# Patient Record
Sex: FEMALE | Race: WHITE | Employment: UNEMPLOYED | URBAN - METROPOLITAN AREA
[De-identification: names, ages, dates, MRNs, and addresses within clinical notes are randomized per-mention and may not be internally consistent; named-entity substitution may affect disease eponyms.]

---

## 2017-09-27 ENCOUNTER — HOSPITAL ENCOUNTER (OUTPATIENT)
Dept: PHYSICAL THERAPY | Age: 57
Discharge: HOME | End: 2017-09-27
Payer: COMMERCIAL

## 2017-09-27 PROCEDURE — 97140 MANUAL THERAPY 1/> REGIONS: CPT

## 2017-09-27 PROCEDURE — 97162 PT EVAL MOD COMPLEX 30 MIN: CPT

## 2017-09-27 PROCEDURE — 97530 THERAPEUTIC ACTIVITIES: CPT

## 2017-09-27 NOTE — PROGRESS NOTES
[address] [facility] AT [facility]  [address] [facility] [address] [address],[address], Miguel ledesma, Nadine Davison  Phone [phone]  Fax [phone] / [address] [address] PHYSICAL THERAPY SERVICES  Patient Name: Yanick Ho : 1960   Medical   Diagnosis: S/P rotator cuff repair [Z98.890]  Right shoulder pain [M25.511] Treatment Diagnosis: Right shoulder pain [M25.511]   Onset Date: Chronic x 2 years; DOS 2017     Referral Source: Ana Quintanilla MD Start of [facility]): 2017   Prior Hospitalization: See medical history Provider #: [id number]   Prior Level of Function: Indpendent ADL/IADL's. Able to complete household chores and recreational exercise at gym (cardio, circuit training)   Comorbidities: Unremarkable per pt intake form   Medications: Verified on Patient Summary List   The Plan of Care and following information is based on the information from the initial evaluation.   =================================================================================  Assessment / key information:  Pt is a 62y.o. year old, RHD female with subjective complaints of R shoulder pain s/p RCR, SAD on 2017. Current pain is rated as 5/10 best, worst and current. Right arm is in sling, presents with post op dressing in place. FOTO score= 16/100 indicating significant impairment to functional activities. Today's evaulation is significant for:   Dressing is removed and surgical site observed with good signs of healing, steri-strips are in place with no break through bleeding, drainage or excessive swelling noted. Pt and dtr are educated on ongoing wound care (i.e. Leave steri-strips dry and intact) and red flags including redness, warmth, drainage, bleeding and they verbalize understanding and agreement to f/u with MD with any concerns if they develop. PROM measurements as follows: flex= 54, abduction= 60, IR/ER measured in scapular plane= 55/0 degrees respectively.   Pt with moderate muscle guarding to PROM and limited cervical motions due to c/o dizziness from pain meds. Denies numbness/paresthesias. Pt and dtr educated on appropriate HEP for wrist/ strength and plan for skilled progression as per MD protocol. Pt will be a good candidate for skilled PT to address these impairments and promote return to normal ADLs and functional mobility for improved quality of life.    =================================================================================  Eval Complexity: History: MEDIUM  Complexity : 1-2 comorbidities / personal factors will impact the outcome/ POC ; chronicity. Exam:MEDIUM Complexity : 3 Standardized tests and measures addressing body structure, function, activity limitation and / or participation in recreation  Presentation: MEDIUM Complexity : Evolving with changing characteristics  Clinical Decision Making:HIGH Complexity : FOTO score of 1- 25 Overall Complexity:MEDIUM  Problem List: pain affecting function, decrease ROM, decrease strength, decrease ADL/ functional abilitiies, decrease activity tolerance and decrease flexibility/ joint mobility   Treatment Plan may include any combination of the following: Therapeutic exercise, Therapeutic activities, Neuromuscular re-education, Physical agent/modality, Manual therapy, Patient education and Self Care training  Patient / Family readiness to learn indicated by: asking questions, trying to perform skills and interest  Persons(s) to be included in education: patient (P)  Barriers to Learning/Limitations: None  Measures taken:    Patient Goal (s): \"proper healing, full range of motion and strength\"   Patient self reported health status: excellent  Rehabilitation Potential: good   Short Term Goals: To be accomplished in  4  weeks:  1. Patient will demonstrate at least 140 degrees passive flexion. 2.  Independent/compliant with appropriate HEP for RUE strength/ROM as per protocol.   3.  Patient will report 50% improvement in sleep quality stemming from shoulder symptoms    · Long Term Goals: To be accomplished in  6-8  weeks:  1. Pt will be able to progress to active assisted/AROM activities up to 90 degrees pending MD clearance  2. Improve FOTO outcome score by 20-25% to indicate a significant improvement in ADL function  3. Patient will report > 50% functional improvement with washing, dressing ADL's    Frequency / Duration:   Patient to be seen  2-3  times per week for 4-6  Weeks for a total of 12-18 visits:  Patient / Caregiver education and instruction: activity modification, brace/ splint application and exercises  G-Codes (GP): n/a  Therapist Signature: Clarita Crockett PT Date: 7/27/0622   Certification Period: n/a Time: 10:26 AM   ===========================================================================================  I certify that the above Physical Therapy Services are being furnished while the patient is under my care. I agree with the treatment plan and certify that this therapy is necessary. Physician Signature:        Date:       Time:     Please sign and return to [facility] or you may fax the signed copy to [phone]. Thank you.

## 2017-09-27 NOTE — PROGRESS NOTES
PT DAILY TREATMENT NOTE     Patient Name: Radha Ho  Date:2017  : 1960  [x]  Patient  Verified  Payor: BLUE CROSS / Plan: Chip Morales [id number] PPO / Product Type: PPO /    In time: 11:40  Out time:12:18  Total Treatment Time (min): 38  Visit #: 1 of     Treatment Area: S/P rotator cuff repair [Z98.890]  Right shoulder pain [M25.511]    SUBJECTIVE  Pain Level (0-10 scale): 5  Any medication changes, allergies to medications, adverse drug reactions, diagnosis change, or new procedure performed?: [x] No    [] Yes (see summary sheet for update)  Subjective functional status/changes:   [] No changes reported  See POC    OBJECTIVE    See exam on chart for details on objective findings      30 min [x]Eval                  []Re-Eval         8 min Therapeutic Activity:  [x]  See flow sheet. Dressing removed and pt/dtr educated on wound care management; (i.e. To leave steri-strips dry and in place.) Ed on red flags and appropriate referral to MD should any develop. Ed on positioning for sleep with pillow under arm as it is supported in sling; advised to sleep propped up; states she is doing this already. Ed on use of ice PRN for pain control. Rationale:    to improve the patients ability to self manage pain and improve tolerance to sleeping       15 min Manual Therapy:  PROM R shoulder through pain free ROM; gentle. PROM wrist/elbow   Rationale: increase ROM and increase tissue extensibility to perform functional mobility/ADLs and attain goals. With   [] TE   [] TA   [] neuro   [] other: Patient Education: [x] Review HEP    [] Progressed/Changed HEP based on:   [] positioning   [] body mechanics   [] transfers   [] heat/ice application    [] other:      Other Objective/Functional Measures: See POC     Pain Level (0-10 scale) post treatment: 6    ASSESSMENT/Changes in Function: See POC; muscle guarding with PROM limiting tolerance.   Dizziness with attempted UT stretches due to pain meds per pt. Patient will continue to benefit from skilled PT services to modify and progress therapeutic interventions, address ROM deficits and analyze and address soft tissue restrictions to attain remaining goals. []  See Plan of Care  []  See progress note/recertification  []  See Discharge Summary         Progress towards goals / Updated goals:  See POC    PLAN  []  Upgrade activities as tolerated     [x]  Continue plan of care  []  Update interventions per flow sheet       []  Discharge due to:_  []  Other:_      Tejinder Crockett, PT 9/27/2017  12:39 PM    Future Appointments  Date Time Provider Nancy Cantu   9/29/2017 7:30 AM Padma Knapp, PTA [facility]   10/3/2017 10:00 AM [facility] PT [facility] 1 [facility]   10/5/2017 3:00 PM [facility] PT [facility] 1 [facility]   10/6/2017 11:00 AM [facility] PT [facility] 1 [facility]

## 2017-09-29 ENCOUNTER — HOSPITAL ENCOUNTER (OUTPATIENT)
Dept: PHYSICAL THERAPY | Age: 57
Discharge: HOME | End: 2017-09-29
Payer: COMMERCIAL

## 2017-09-29 PROCEDURE — 97140 MANUAL THERAPY 1/> REGIONS: CPT

## 2017-09-29 NOTE — PROGRESS NOTES
PT DAILY TREATMENT NOTE     Patient Name: Gabby Ho  Date:2017  : 1960  [x]  Patient  Verified  Payor: Clyde Ball / Plan: Chip Morales [id number] PPO / Product Type: PPO /    In time:7:40  Out time:8:30  Total Treatment Time (min): 50  Visit #: 2 of     Treatment Area: S/P rotator cuff repair [Z98.890]  Right shoulder pain [M25.511]    SUBJECTIVE  Pain Level IN: (0-10 scale): 5/10   Pain Level OUT: (0-10 scale) post treatment: 3-4/10    Any medication changes, allergies to medications, adverse drug reactions, diagnosis change, or new procedure performed?: [x] No    [] Yes (see summary sheet for update)  Subjective functional status/changes:   [] No changes reported  I am not feeling good at all - I do not do good on pain medication - I am sick to my stomach and I am having a hard time sleeping too    OBJECTIVE    Modality rationale: decrease edema, decrease inflammation and decrease pain to improve the patients ability to increase shoulder ROM   Min Type Additional Details    [] Estim:  []Unatt       []IFC  []Premod                        []Other:  []w/ice   []w/heat  Position:  Location:    [] Estim: []Att    []TENS instruct  []NMES                    []Other:  []w/US   []w/ice   []w/heat  Position:  Location:    []  Traction: [] Cervical       []Lumbar                       [] Prone          []Supine                       []Intermittent   []Continuous Lbs:  [] before manual  [] after manual    []  Ultrasound: []Continuous   [] Pulsed                           []1MHz   []3MHz W/cm2:  Location:    []  Iontophoresis with dexamethasone         Location: [] Take home patch   [] In clinic   15 [x]  Ice     []  heat  []  Ice massage  []  Laser   []  Anodyne Position:in supine  Location:(R) shoulder     []  Laser with stim  []  Other:  Position:  Location:    []  Vasopneumatic Device Pressure:       [] lo [] med [] hi   Temperature: [] lo [] med [] hi   [] Skin assessment post-treatment: []intact []redness- no adverse reaction    []redness  adverse reaction:       5 min Therapeutic Exercise:  [x] See flow sheet : first follow up visit since initial evaluation - initiated POC per flow sheet and per protocol     Rationale: increase ROM and increase strength to improve the patients ability to use the (R) shoulder per protocol      25 min Manual Therapy: In semi-reclined position - PROM   Rationale: decrease pain, increase ROM, increase tissue extensibility and decrease edema  to (R) shoulder            5 With   [] TE   [] TA   [] neuro   [] other: Patient Education: [x] Review HEP    [] Progressed/Changed HEP based on:   [x] positioning   [] body mechanics   [] transfers   [x] heat/ice application    [] other:      Other Objective/Functional Measures: first follow up visit since initial evaluation - initiated POC per flow sheet        Patient presents with moderate feelings of nausea and discomfort with gentle ROM - progress as able - gave patient and  ideas to assist and decrease feeling of nausea         ASSESSMENT/Changes in Function:     Patient will continue to benefit from skilled PT services to modify and progress therapeutic interventions, address functional mobility deficits, address ROM deficits, address strength deficits, analyze and address soft tissue restrictions and instruct in home and community integration to attain remaining goals. [x]  See Plan of Care  []  See progress note/recertification  []  See Discharge Summary         Progress towards goals / Updated goals: · Short Term Goals: To be accomplished in  4  weeks:  1. Patient will demonstrate at least 140 degrees passive flexion. 2.  Independent/compliant with appropriate HEP for RUE strength/ROM as per protocol. 3.  Patient will report 50% improvement in sleep quality stemming from shoulder symptoms     · Long Term Goals: To be accomplished in  6-8  weeks:  1.   Pt will be able to progress to active assisted/AROM activities up to 90 degrees pending MD clearance  2. Improve FOTO outcome score by 20-25% to indicate a significant improvement in ADL function  3.   Patient will report > 50% functional improvement with washing, dressing ADL's       PLAN  [x]  Upgrade activities as tolerated     [x]  Continue plan of care  []  Update interventions per flow sheet       []  Discharge due to:_  []  Other:_      Jessa Mare, PTA 9/29/2017  8:24 AM    Future Appointments  Date Time Provider Nancy Cantu   10/3/2017 10:00 AM [address] 1 [facility]   10/5/2017 3:00 PM [facility] PT [place] 1 [facility]   10/6/2017 11:00 AM [facility] PT [place] 1 [facility]

## 2017-10-03 ENCOUNTER — HOSPITAL ENCOUNTER (OUTPATIENT)
Dept: PHYSICAL THERAPY | Age: 57
Discharge: HOME | End: 2017-10-03
Payer: COMMERCIAL

## 2017-10-03 PROCEDURE — 97110 THERAPEUTIC EXERCISES: CPT

## 2017-10-03 PROCEDURE — 97140 MANUAL THERAPY 1/> REGIONS: CPT

## 2017-10-03 PROCEDURE — 97016 VASOPNEUMATIC DEVICE THERAPY: CPT

## 2017-10-03 NOTE — PROGRESS NOTES
PT DAILY TREATMENT NOTE     Patient Name: Rebecca Ho  Date:10/3/2017  : 1960  [x]  Patient  Verified  Payor: BLUE CROSS / Plan: Chip Morales [id number] PPO / Product Type: PPO /    In time:10:00  Out time: 11:00  Total Treatment Time (min): 60  Visit #: 3 of     Treatment Area: S/P rotator cuff repair [Z98.890]  Right shoulder pain [M25.511]    SUBJECTIVE  Pain Level IN: (0-10 scale): 5/10   Pain Level OUT: (0-10 scale) post treatment: 5/10    Any medication changes, allergies to medications, adverse drug reactions, diagnosis change, or new procedure performed?: [x] No    [] Yes (see summary sheet for update)  Subjective functional status/changes:   [] No changes reported  Pt reports that she is unable to take pain medication due to nausea. Pt is rotating Tylenol and Ibuprofen.     OBJECTIVE    Modality rationale: decrease edema, decrease inflammation and decrease pain to improve the patients ability to increase shoulder ROM   Min Type Additional Details    [] Estim:  []Unatt       []IFC  []Premod                        []Other:  []w/ice   []w/heat  Position:  Location:    [] Estim: []Att    []TENS instruct  []NMES                    []Other:  []w/US   []w/ice   []w/heat  Position:  Location:    []  Traction: [] Cervical       []Lumbar                       [] Prone          []Supine                       []Intermittent   []Continuous Lbs:  [] before manual  [] after manual    []  Ultrasound: []Continuous   [] Pulsed                           []1MHz   []3MHz W/cm2:  Location:    []  Iontophoresis with dexamethasone         Location: [] Take home patch   [] In clinic    [x]  Ice     []  heat  []  Ice massage  []  Laser   []  Anodyne Position:in supine  Location:(R) shoulder     []  Laser with stim  []  Other:  Position:  Location:   15+ 5min set up [x]  Vasopneumatic Device Pressure:       [x] lo [] med [] hi   Temperature: [x] lo [] med [] hi   [] Skin assessment post-treatment:  []intact []redness- no adverse reaction    []redness  adverse reaction:       15 min Therapeutic Exercise:  [x] See flow sheet : per protocol     Rationale: increase ROM and increase strength to improve the patients ability to use the (R) shoulder per protocol      20 min Manual Therapy: In semi-reclined position - PROM, STM to R bicep and UT   Rationale: decrease pain, increase ROM, increase tissue extensibility and decrease edema  to (R) shoulder            5 With   [] TE   [] TA   [] neuro   [] other: Patient Education: [x] Review HEP    [] Progressed/Changed HEP based on:   [x] positioning   [] body mechanics   [] transfers   [x] heat/ice application    [x] other: pendulums, sleeping position     Other Objective/Functional Measures:   Began Dynagrip, AROM Wrist Ext/Flex, Pron/Sup, AROM Elbow Flex/Ext    ASSESSMENT/Changes in Function: Pt now alternating Tylenol and Ibuprofen for pain management with decreased nausea. Pt significantly guards with PROM but responds well to oscillations. Pt tolerated therex well. Patient will continue to benefit from skilled PT services to modify and progress therapeutic interventions, address functional mobility deficits, address ROM deficits, address strength deficits, analyze and address soft tissue restrictions and instruct in home and community integration to attain remaining goals. [x]  See Plan of Care  []  See progress note/recertification  []  See Discharge Summary         Progress towards goals / Updated goals: · Short Term Goals: To be accomplished in  4  weeks:  1. Patient will demonstrate at least 140 degrees passive flexion. 2.  Independent/compliant with appropriate HEP for RUE strength/ROM as per protocol. 3.  Patient will report 50% improvement in sleep quality stemming from shoulder symptoms     · Long Term Goals: To be accomplished in  6-8  weeks:  1. Pt will be able to progress to active assisted/AROM activities up to 90 degrees pending MD clearance  2. Improve FOTO outcome score by 20-25% to indicate a significant improvement in ADL function  3.   Patient will report > 50% functional improvement with washing, dressing ADL's       PLAN  [x]  Upgrade activities as tolerated     [x]  Continue plan of care  []  Update interventions per flow sheet       []  Discharge due to:_  []  Other:_      Angélica Arteaga, PT, DPT  10/3/2017      Future Appointments  Date Time Provider Nancy Cantu   10/3/2017 10:00 AM [address] 1 [facility]   10/5/2017 3:00 PM [facility] PT [place] 1 [facility]   10/6/2017 11:00 AM [facility] PT [place] 1 [facility]

## 2017-10-05 ENCOUNTER — HOSPITAL ENCOUNTER (OUTPATIENT)
Dept: PHYSICAL THERAPY | Age: 57
Discharge: HOME | End: 2017-10-05
Payer: COMMERCIAL

## 2017-10-05 PROCEDURE — 97140 MANUAL THERAPY 1/> REGIONS: CPT

## 2017-10-05 PROCEDURE — 97016 VASOPNEUMATIC DEVICE THERAPY: CPT

## 2017-10-05 NOTE — PROGRESS NOTES
PT DAILY TREATMENT NOTE     Patient Name: Sabrina Ho  Date:10/5/2017  : 1960  [x]  Patient  Verified  Payor: BLUE CROSS / Plan: Chip Morales [id number] PPO / Product Type: PPO /    In time: 2:59  Out time: 4:00  Total Treatment Time (min): 61  Visit #: 4 of     Treatment Area: S/P rotator cuff repair [Z98.890]  Right shoulder pain [M25.511]    SUBJECTIVE  Pain Level IN: (0-10 scale): 4/10   Pain Level OUT: (0-10 scale) post treatment: 4/10    Any medication changes, allergies to medications, adverse drug reactions, diagnosis change, or new procedure performed?: [x] No    [] Yes (see summary sheet for update)  Subjective functional status/changes:   [x] No changes reported    OBJECTIVE    Modality rationale: decrease edema, decrease inflammation and decrease pain to improve the patients ability to increase shoulder ROM   Min Type Additional Details    [] Estim:  []Unatt       []IFC  []Premod                        []Other:  []w/ice   []w/heat  Position:  Location:    [] Estim: []Att    []TENS instruct  []NMES                    []Other:  []w/US   []w/ice   []w/heat  Position:  Location:    []  Traction: [] Cervical       []Lumbar                       [] Prone          []Supine                       []Intermittent   []Continuous Lbs:  [] before manual  [] after manual    []  Ultrasound: []Continuous   [] Pulsed                           []1MHz   []3MHz W/cm2:  Location:    []  Iontophoresis with dexamethasone         Location: [] Take home patch   [] In clinic   10 + 2 min set up []  Ice     [x]  heat  []  Ice massage  []  Laser   []  Anodyne Position:in reclined  Location:(R) shoulder     []  Laser with stim  []  Other:  Position:  Location:   15+ 5min set up [x]  Vasopneumatic Device Pressure:       [x] lo [] med [] hi   Temperature: [x] lo [] med [] hi   [] Skin assessment post-treatment:  []intact []redness- no adverse reaction    []redness  adverse reaction:       3 min Therapeutic Exercise:  [x] See flow sheet : per protocol     Rationale: increase ROM and increase strength to improve the patients ability to use the (R) shoulder per protocol      25 min Manual Therapy: In semi-reclined position - PROM, STM to R bicep and UT   Rationale: decrease pain, increase ROM, increase tissue extensibility and decrease edema  to (R) shoulder             With   [] TE   [] TA   [] neuro   [] other: Patient Education: [x] Review HEP    [] Progressed/Changed HEP based on:   [] positioning   [] body mechanics   [] transfers   [x] heat/ice application    [] other:      Other Objective/Functional Measures:   PROM measured in supine: 68 deg abd, flex 106 deg    ASSESSMENT/Changes in Function: Pt with increased PROM in flexion and abduction as noted above. Pt continues to guard at end range due to pain, but is overall improving. Continue to progress per protocol. Patient will continue to benefit from skilled PT services to modify and progress therapeutic interventions, address functional mobility deficits, address ROM deficits, address strength deficits, analyze and address soft tissue restrictions and instruct in home and community integration to attain remaining goals. []  See Plan of Care  []  See progress note/recertification  []  See Discharge Summary         Progress towards goals / Updated goals: · Short Term Goals: To be accomplished in  4  weeks:  1. Patient will demonstrate at least 140 degrees passive flexion. 2.  Independent/compliant with appropriate HEP for RUE strength/ROM as per protocol. 3.  Patient will report 50% improvement in sleep quality stemming from shoulder symptoms     · Long Term Goals: To be accomplished in  6-8  weeks:  1. Pt will be able to progress to active assisted/AROM activities up to 90 degrees pending MD clearance  2. Improve FOTO outcome score by 20-25% to indicate a significant improvement in ADL function  3.   Patient will report > 50% functional improvement with washing, dressing ADL's       PLAN  [x]  Upgrade activities as tolerated     [x]  Continue plan of care  []  Update interventions per flow sheet       []  Discharge due to:_  []  Other:_      Candice Acosta, PT, DPT  10/5/2017      Future Appointments  Date Time Provider Nancy Cantu   10/5/2017 3:00 PM [address] [facility] [address]   10/6/2017 11:00 AM [address] [place] [address] [address]

## 2017-10-06 ENCOUNTER — HOSPITAL ENCOUNTER (OUTPATIENT)
Dept: PHYSICAL THERAPY | Age: 57
Discharge: HOME | End: 2017-10-06
Payer: COMMERCIAL

## 2017-10-06 PROCEDURE — 97140 MANUAL THERAPY 1/> REGIONS: CPT

## 2017-10-06 PROCEDURE — 97016 VASOPNEUMATIC DEVICE THERAPY: CPT

## 2017-10-06 NOTE — PROGRESS NOTES
PT DAILY TREATMENT NOTE     Patient Name: Titi oH  Date:10/6/2017  : 1960  [x]  Patient  Verified  Payor: BLUE [place] / Plan: Chip Morales [id number] PPO / Product Type: PPO /    In time: 11:00  Out time: 12:00  Total Treatment Time (min): 60  Visit #: 5 of     Treatment Area: S/P rotator cuff repair [Z98.890]  Right shoulder pain [M25.511]    SUBJECTIVE  Pain Level IN: (0-10 scale): 4/10   Pain Level OUT: (0-10 scale) post treatment: 3.5/10    Any medication changes, allergies to medications, adverse drug reactions, diagnosis change, or new procedure performed?: [x] No    [] Yes (see summary sheet for update)  Subjective functional status/changes:   [x] No changes reported  Pt reports a better night sleep last night but has had some quezziness past 1.5 hrs.     OBJECTIVE    Modality rationale: decrease edema, decrease inflammation and decrease pain to improve the patients ability to increase shoulder ROM   Min Type Additional Details    [] Estim:  []Unatt       []IFC  []Premod                        []Other:  []w/ice   []w/heat  Position:  Location:    [] Estim: []Att    []TENS instruct  []NMES                    []Other:  []w/US   []w/ice   []w/heat  Position:  Location:    []  Traction: [] Cervical       []Lumbar                       [] Prone          []Supine                       []Intermittent   []Continuous Lbs:  [] before manual  [] after manual    []  Ultrasound: []Continuous   [] Pulsed                           []1MHz   []3MHz W/cm2:  Location:    []  Iontophoresis with dexamethasone         Location: [] Take home patch   [] In clinic   10 + 2 min set up []  Ice     [x]  heat  []  Ice massage  []  Laser   []  Anodyne Position:in reclined  Location:(R) shoulder     []  Laser with stim  []  Other:  Position:  Location:   15+ 5min set up [x]  Vasopneumatic Device Pressure:       [x] lo [] med [] hi   Temperature: [x] lo [] med [] hi   [] Skin assessment post-treatment:  []intact []redness- no adverse reaction    []redness  adverse reaction:       5 min Therapeutic Exercise:  [x] See flow sheet : per protocol     Rationale: increase ROM and increase strength to improve the patients ability to use the (R) shoulder per protocol      23 min Manual Therapy: In semi-reclined position - PROM, STM to R bicep and UT   Rationale: decrease pain, increase ROM, increase tissue extensibility and decrease edema  to (R) shoulder             With   [] TE   [] TA   [] neuro   [] other: Patient Education: [x] Review HEP    [] Progressed/Changed HEP based on:   [] positioning   [] body mechanics   [] transfers   [x] heat/ice application    [] other:      Other Objective/Functional Measures:   PROM measured in reclined 140 deg flex with OP with slight IR  Pt with decreased guarding with lowering     ASSESSMENT/Changes in Function: Pt with increased PROM in flexion and abduction as noted above. Pt continues to guard at end range due to pain, but is overall improving. Continue to progress per protocol. Patient will continue to benefit from skilled PT services to modify and progress therapeutic interventions, address functional mobility deficits, address ROM deficits, address strength deficits, analyze and address soft tissue restrictions and instruct in home and community integration to attain remaining goals. []  See Plan of Care  []  See progress note/recertification  []  See Discharge Summary         Progress towards goals / Updated goals: · Short Term Goals: To be accomplished in  4  weeks:  1. Patient will demonstrate at least 140 degrees passive flexion. 10/6/2017 Progressing  2. Independent/compliant with appropriate HEP for RUE strength/ROM as per protocol. 3.  Patient will report 50% improvement in sleep quality stemming from shoulder symptoms     · Long Term Goals: To be accomplished in  6-8  weeks:  1.   Pt will be able to progress to active assisted/AROM activities up to 90 degrees pending MD clearance  2. Improve FOTO outcome score by 20-25% to indicate a significant improvement in ADL function  3. Patient will report > 50% functional improvement with washing, dressing ADL's       PLAN  [x]  Upgrade activities as tolerated     [x]  Continue plan of care  []  Update interventions per flow sheet       []  Discharge due to:_  []  Other:_      Nhi Aden PT, DPT  10/6/2017      No future appointments.

## 2017-10-09 ENCOUNTER — HOSPITAL ENCOUNTER (OUTPATIENT)
Dept: PHYSICAL THERAPY | Age: 57
Discharge: HOME | End: 2017-10-09
Payer: COMMERCIAL

## 2017-10-09 PROCEDURE — 97016 VASOPNEUMATIC DEVICE THERAPY: CPT

## 2017-10-09 PROCEDURE — 97140 MANUAL THERAPY 1/> REGIONS: CPT

## 2017-10-09 NOTE — PROGRESS NOTES
[address] [facility]  [address] [place] [address],[address], [facility] [address], [address]  Phone [phone]  Fax [phone]  PROGRESS NOTE  Patient Name: Brandi Ho : 1960   Treatment/Medical Diagnosis: S/P rotator cuff repair [Z98.890]  Right shoulder pain [M25.511]   Referral Source: King Mistry MD     Date of Initial Visit: 17 Attended Visits: 6 Missed Visits: 0     SUMMARY OF TREATMENT  Patient has received physical therapy for RCR and SAD on 17 since 17. Treatment has included therapeutic stretching,manual/massage and modalities as need to assist with decreasing pain and increasing PROM per protocol    CURRENT STATUS  Patient has completed 6 visits  Patient presents with improved tolerance with PT and decrease nausea and pain secondary to change of post surgical pain medication and decrease use of anti-nausea medication as well. Patient presents with compliance with current protocol and wearing sling at all times, as instructed  PROM measured in reclined:   140 deg with flexion and scaption  30 degrees of ER  approx 120 with abduction           RECOMMENDATIONS  Continue with above POC and current protocol for 2 to 3 times a week for 4 weeks to achieve above goals    If you have any questions/comments please contact us directly at [phone]. Thank you for allowing us to assist in the care of your patient. Therapist Signature: Dana Romero PTA Date: 10/9/2017     Time: 9:50 AM   NOTE TO PHYSICIAN:  PLEASE COMPLETE THE ORDERS BELOW AND FAX TO   [facility]: [phone].   If you are unable to process this request in 24 hours please contact our office: [phone].    ___ I have read the above report and request that my patient continue as recommended.   ___ I have read the above report and request that my patient continue therapy with the following changes/special instructions:_________________________________________________________   ___ I have read the above report and request that my patient be discharged from therapy.      Physician Signature:        Date:       Time:

## 2017-10-11 ENCOUNTER — HOSPITAL ENCOUNTER (OUTPATIENT)
Dept: PHYSICAL THERAPY | Age: 57
Discharge: HOME | End: 2017-10-11
Payer: COMMERCIAL

## 2017-10-11 PROCEDURE — 97016 VASOPNEUMATIC DEVICE THERAPY: CPT | Performed by: PHYSICAL THERAPIST

## 2017-10-11 PROCEDURE — 97140 MANUAL THERAPY 1/> REGIONS: CPT | Performed by: PHYSICAL THERAPIST

## 2017-10-11 NOTE — PROGRESS NOTES
PT DAILY TREATMENT NOTE     Patient Name: Woodie Brunner Gleason  Date:10/11/2017  : 1960  [x]  Patient  Verified  Payor: Ashleigh Braden / Plan: Chip Morales [id number] PPO / Product Type: PPO /    In time: 245pm Out time: 355pm  Total Treatment Time (min):70  Visit #: 1 of     Treatment Area: S/P rotator cuff repair [Z98.890]  Right shoulder pain [M25.511]    SUBJECTIVE  Pain Level IN: (0-10 scale): 2.5 -3/10   Pain Level OUT: (0-10 scale) post treatment: 2.5-3/10    Any medication changes, allergies to medications, adverse drug reactions, diagnosis change, or new procedure performed?: [x] No    [] Yes (see summary sheet for update)  Subjective functional status/changes:   [x] No changes reported  I went to the beach yesterday - or I tried to go to the beach but once I got there I was already having so much pain and went home - I did realize that I must have undid my sling in the middle of the night and I must of flinched and it woke me up and I was hurting     OBJECTIVE    Modality rationale: decrease edema, decrease inflammation and decrease pain to improve the patients ability to increase shoulder ROM   Min Type Additional Details    [] Estim:  []Unatt       []IFC  []Premod                        []Other:  []w/ice   []w/heat  Position:  Location:    [] Estim: []Att    []TENS instruct  []NMES                    []Other:  []w/US   []w/ice   []w/heat  Position:  Location:    []  Traction: [] Cervical       []Lumbar                       [] Prone          []Supine                       []Intermittent   []Continuous Lbs:  [] before manual  [] after manual    []  Ultrasound: []Continuous   [] Pulsed                           []1MHz   []3MHz W/cm2:  Location:    []  Iontophoresis with dexamethasone         Location: [] Take home patch   [] In clinic   10  []  Ice     [x]  heat  []  Ice massage  []  Laser   []  Anodyne Position:in reclined  Location:(R) shoulder     []  Laser with stim  []  Other: Position:  Location:   15+ 5min set up [x]  Vasopneumatic Device Pressure:       [x] lo [] med [] hi   Temperature: [x] lo [] med [] hi   [] Skin assessment post-treatment:  []intact []redness- no adverse reaction    []redness  adverse reaction:        min Therapeutic Exercise:  [x] See flow sheet : HEP   Rationale: increase ROM and increase strength to improve the patients ability to use the (R) shoulder per protocol      40/38 min Manual Therapy: In semi-reclined position - PROM, STM to scapula border   Rationale: decrease pain, increase ROM, increase tissue extensibility and decrease edema  to (R) shoulder             With   [] TE   [] TA   [] neuro   [] other: Patient Education: [x] Review HEP    [] Progressed/Changed HEP based on:   [] positioning   [] body mechanics   [] transfers   [x] heat/ice application    [] other:      Other Objective/Functional Measures:   PROM measured in reclined 140 deg with flexion and scaption -STG#1 MET  30 degrees of ER with slow movement and VC for relaxation, increased to green gripper and gave scap squeezes and shld rolls for HEP  approx 90 ABD      ASSESSMENT/Changes in Function:  Patient had very sharp pain with coming down from flexion and significant mm guarding at that point. Continuous VC for relaxation required through out session. Patient continues to report diffiuculty sleeping. Patient will continue to benefit from skilled PT services to modify and progress therapeutic interventions, address functional mobility deficits, address ROM deficits, address strength deficits, analyze and address soft tissue restrictions and instruct in home and community integration to attain remaining goals. []  See Plan of Care  [x]  See progress note/recertification  []  See Discharge Summary         Progress towards goals / Updated goals: · Short Term Goals: To be accomplished in  4  weeks:  1. Patient will demonstrate at least 140 degrees passive flexion. MET 10/9/17  2. Independent/compliant with appropriate HEP for RUE strength/ROM as per protocol. MET performing daily  10-11-17  3. Patient will report 50% improvement in sleep quality stemming from shoulder symptoms     · Long Term Goals: To be accomplished in  6-8  weeks:  1. Pt will be able to progress to active assisted/AROM activities up to 90 degrees pending MD clearance  2. Improve FOTO outcome score by 20-25% to indicate a significant improvement in ADL function  3.   Patient will report > 50% functional improvement with washing, dressing ADL's     PLAN  [x]  Upgrade activities as tolerated     [x]  Continue plan of care  []  Update interventions per flow sheet       []  Discharge due to:_  [x]  Other:_  Please refer to progress report     Britton Ross PT,   10/11/2017      Future Appointments  Date Time Provider Nancy Cantu   10/13/2017 8:00 AM Britton Ross PT [facility]   10/16/2017 2:00 PM [facility] PT [facility]   10/18/2017 8:30 AM Jackie Thompson PTA [facility]   10/20/2017 10:30 AM Britton Ross PT [facility]

## 2017-10-13 ENCOUNTER — HOSPITAL ENCOUNTER (OUTPATIENT)
Dept: PHYSICAL THERAPY | Age: 57
Discharge: HOME | End: 2017-10-13
Payer: COMMERCIAL

## 2017-10-13 PROCEDURE — 97140 MANUAL THERAPY 1/> REGIONS: CPT | Performed by: PHYSICAL THERAPIST

## 2017-10-13 PROCEDURE — 97016 VASOPNEUMATIC DEVICE THERAPY: CPT | Performed by: PHYSICAL THERAPIST

## 2017-10-13 NOTE — PROGRESS NOTES
PT DAILY TREATMENT NOTE     Patient Name: Elkin Ho  Date:10/13/2017  : 1960  [x]  Patient  Verified  Payor: [employer]UE [place] / Plan: Chip Morales [id number] PPO / Product Type: PPO /    In time:835 am Out time: 940am  Total Treatment Time (min):65  Visit #: 2 of     Treatment Area: S/P rotator cuff repair [Z98.890]  Right shoulder pain [M25.511]    SUBJECTIVE  Pain Level IN: (0-10 scale): 310   Pain Level OUT: (0-10 scale) post treatment: 1/10    Any medication changes, allergies to medications, adverse drug reactions, diagnosis change, or new procedure performed?: [x] No    [] Yes (see summary sheet for update)  Subjective functional status/changes:   [x] No changes reported  ' I have been doing my scapular squeezes and rolls which has helped. \"    OBJECTIVE    Modality rationale: decrease edema, decrease inflammation and decrease pain to improve the patients ability to increase shoulder ROM   Min Type Additional Details    [] Estim:  []Unatt       []IFC  []Premod                        []Other:  []w/ice   []w/heat  Position:  Location:    [] Estim: []Att    []TENS instruct  []NMES                    []Other:  []w/US   []w/ice   []w/heat  Position:  Location:    []  Traction: [] Cervical       []Lumbar                       [] Prone          []Supine                       []Intermittent   []Continuous Lbs:  [] before manual  [] after manual    []  Ultrasound: []Continuous   [] Pulsed                           []1MHz   []3MHz W/cm2:  Location:    []  Iontophoresis with dexamethasone         Location: [] Take home patch   [] In clinic   10  []  Ice     [x]  heat  []  Ice massage  []  Laser   []  Anodyne Position:in reclined  Location:(R) shoulder     []  Laser with stim  []  Other:  Position:  Location:   15+ 5min set up [x]  Vasopneumatic Device Pressure:       [x] lo [] med [] hi   Temperature: [x] lo [] med [] hi   [] Skin assessment post-treatment:  []intact []redness- no adverse reaction    []redness  adverse reaction:        min Therapeutic Exercise:  [x] See flow sheet : HEP   Rationale: increase ROM and increase strength to improve the patients ability to use the (R) shoulder per protocol      35 min Manual Therapy: In semi-reclined position - PROM, STM to scapula border   Rationale: decrease pain, increase ROM, increase tissue extensibility and decrease edema  to (R) shoulder             With   [] TE   [] TA   [] neuro   [] other: Patient Education: [x] Review HEP    [] Progressed/Changed HEP based on:   [] positioning   [] body mechanics   [] transfers   [x] heat/ice application    [] other:      Other Objective/Functional Measures:   Continues with PROM per protocol however patient has very sharp spikes in pain when approaching 120-140 deg of flexion, episodes cause patient 10+ pain for brief period and patient has significant anxiety with further PROM after this occurs. , episode occurred once today with flexion as well as last treatment. ABD with no sharp pain to 90deg only, sleep quality improvements 25%    ASSESSMENT/Changes in Function:  Patient had very sharp pain with coming down from flexion and significant mm guarding at that point. Continuous VC for relaxation required through out session. Patient continues to report diffiuculty sleeping but improved . Patient will continue to benefit from skilled PT services to modify and progress therapeutic interventions, address functional mobility deficits, address ROM deficits, address strength deficits, analyze and address soft tissue restrictions and instruct in home and community integration to attain remaining goals. []  See Plan of Care  [x]  See progress note/recertification  []  See Discharge Summary         Progress towards goals / Updated goals: · Short Term Goals: To be accomplished in  4  weeks:  1. Patient will demonstrate at least 140 degrees passive flexion. MET 10/9/17  2.   Independent/compliant with appropriate HEP for RUE strength/ROM as per protocol. MET performing daily  10-11-17  3. Patient will report 50% improvement in sleep quality stemming from shoulder symptoms 25%10-13-17   · Long Term Goals: To be accomplished in  6-8  weeks:  1. Pt will be able to progress to active assisted/AROM activities up to 90 degrees pending MD clearance  2. Improve FOTO outcome score by 20-25% to indicate a significant improvement in ADL function  3.   Patient will report > 50% functional improvement with washing, dressing ADL's     PLAN  []  Upgrade activities as tolerated     [x]  Continue plan of care  []  Update interventions per flow sheet       []  Discharge due to:_  []  Other:_  Please refer to progress report     Nehemiah Dutta PT,   10/13/2017      Future Appointments  Date Time Provider Nancy Cantu   10/13/2017 8:30 AM Nehemiah Dutta PT [facility]   10/16/2017 2:00 PM [facility] PT TH[facility]IA Rojo [facility]   10/18/2017 8:30 AM Thelma Johnson PTA [facility]   10/20/2017 10:30 AM Nehemiah Dutta PT [facility]

## 2017-10-16 ENCOUNTER — HOSPITAL ENCOUNTER (OUTPATIENT)
Dept: PHYSICAL THERAPY | Age: 57
Discharge: HOME | End: 2017-10-16
Payer: COMMERCIAL

## 2017-10-16 PROCEDURE — 97016 VASOPNEUMATIC DEVICE THERAPY: CPT

## 2017-10-16 PROCEDURE — 97140 MANUAL THERAPY 1/> REGIONS: CPT

## 2017-10-16 NOTE — PROGRESS NOTES
PT DAILY TREATMENT NOTE     Patient Name: Marcela Ho  Date:10/16/2017  : 1960  [x]  Patient  Verified  Payor: BLUE [place] / Plan: [facility] PPO / Product Type: PPO /    In time: 2:00pm Out time: 3:00pm  Total Treatment Time (min): 60  Visit #: 3 of  (9)    Treatment Area: S/P rotator cuff repair [Z98.890]  Right shoulder pain [M25.511]    SUBJECTIVE  Pain Level IN: (0-10 scale): /210   Pain Level OUT: (0-10 scale) post treatment: 2/10    Any medication changes, allergies to medications, adverse drug reactions, diagnosis change, or new procedure performed?: [x] No    [] Yes (see summary sheet for update)  Subjective functional status/changes:   [x] No changes reported      OBJECTIVE    Modality rationale: decrease edema, decrease inflammation and decrease pain to improve the patients ability to increase shoulder ROM   Min Type Additional Details    [] Estim:  []Unatt       []IFC  []Premod                        []Other:  []w/ice   []w/heat  Position:  Location:    [] Estim: []Att    []TENS instruct  []NMES                    []Other:  []w/US   []w/ice   []w/heat  Position:  Location:    []  Traction: [] Cervical       []Lumbar                       [] Prone          []Supine                       []Intermittent   []Continuous Lbs:  [] before manual  [] after manual    []  Ultrasound: []Continuous   [] Pulsed                           []1MHz   []3MHz W/cm2:  Location:    []  Iontophoresis with dexamethasone         Location: [] Take home patch   [] In clinic    []  Ice     []  heat  []  Ice massage  []  Laser   []  Anodyne Position:in reclined  Location:(R) shoulder     []  Laser with stim  []  Other:  Position:  Location:   15+ 5min set up [x]  Vasopneumatic Device Pressure:       [] lo [x] med [] hi   Temperature: [x] lo [] med [] hi   [] Skin assessment post-treatment:  []intact []redness- no adverse reaction    []redness  adverse reaction:       5 min Therapeutic Exercise: [x] See flow sheet :    Rationale: increase ROM and increase strength to improve the patients ability to use the (R) shoulder per protocol      35 min Manual Therapy: In semi-reclined position - PROM, STM to lateral scapula border, bicep, and pec minor   Rationale: decrease pain, increase ROM, increase tissue extensibility and decrease edema  to (R) shoulder             With   [] TE   [] TA   [] neuro   [] other: Patient Education: [x] Review HEP    [] Progressed/Changed HEP based on:   [] positioning   [] body mechanics   [] transfers   [x] heat/ice application    [] other:      Other Objective/Functional Measures:   PROM ER at 45 deg abd/flexion: 10 deg  PROM ER at neutral: ~2-3 deg  PROM Abduction: 60 deg     ASSESSMENT/Changes in Function:  Treatment focused on ER rotation today as pt is most limited with ER>Flexion>Abd. Pt achieved 60 deg PROM ABD today with no increase in pain with rotation in neutral. Pt presented with increased guarding today at end range, requiring VCs. Continue to progress PROM per protocol and pt tolerance. Patient will continue to benefit from skilled PT services to modify and progress therapeutic interventions, address functional mobility deficits, address ROM deficits, address strength deficits, analyze and address soft tissue restrictions and instruct in home and community integration to attain remaining goals. []  See Plan of Care  []  See progress note/recertification  []  See Discharge Summary         Progress towards goals / Updated goals: · Short Term Goals: To be accomplished in  4  weeks:  1. Patient will demonstrate at least 140 degrees passive flexion. MET 10/9/17  2. Independent/compliant with appropriate HEP for RUE strength/ROM as per protocol. MET performing daily  10-11-17  3. Patient will report 50% improvement in sleep quality stemming from shoulder symptoms 25%10-13-17   · Long Term Goals: To be accomplished in  6-8  weeks:  1.   Pt will be able to progress to active assisted/AROM activities up to 90 degrees pending MD clearance  2. Improve FOTO outcome score by 20-25% to indicate a significant improvement in ADL function  3.   Patient will report > 50% functional improvement with washing, dressing ADL's     PLAN  []  Upgrade activities as tolerated     [x]  Continue plan of care  []  Update interventions per flow sheet       []  Discharge due to:_  []  Other:_  Please refer to progress report     Oz Carrillo PT, DPT   10/16/2017      Future Appointments  Date Time Provider Nancy Cantu   10/18/2017 8:30 AM Tori Arredondo PTA [facility]   10/20/2017 10:30 AM Daniel Shell PT [facility]

## 2017-10-18 ENCOUNTER — HOSPITAL ENCOUNTER (OUTPATIENT)
Dept: PHYSICAL THERAPY | Age: 57
Discharge: HOME | End: 2017-10-18
Payer: COMMERCIAL

## 2017-10-18 PROCEDURE — 97016 VASOPNEUMATIC DEVICE THERAPY: CPT

## 2017-10-18 PROCEDURE — 97110 THERAPEUTIC EXERCISES: CPT

## 2017-10-18 PROCEDURE — 97140 MANUAL THERAPY 1/> REGIONS: CPT

## 2017-10-18 NOTE — PROGRESS NOTES
PT DAILY TREATMENT NOTE     Patient Name: Robbie Ho  Date:10/18/2017  : 1960  [x]  Patient  Verified  Payor: [employer] [place] / Plan: Chip Morales [id number] PPO / Product Type: PPO /    In time: 8:30 Out time: 9:55  Total Treatment Time (min): 85  Visit #: 4 of  (10)    Treatment Area: S/P rotator cuff repair [Z98.890]  Right shoulder pain [M25.511]    SUBJECTIVE  Pain Level IN: (0-10 scale): 2/10  Pain Level OUT: (0-10 scale) post treatment: 0/10    Any medication changes, allergies to medications, adverse drug reactions, diagnosis change, or new procedure performed?: [x] No    [] Yes (see summary sheet for update)  Subjective functional status/changes:   [x] No changes reported  I still have a hard time sleeping at night, I know it is because I take naps in the middle of the day but I can't help it.  It still hurts when my shoulder comes down when you guys stretch it     OBJECTIVE    Modality rationale: decrease edema, decrease inflammation and decrease pain to improve the patients ability to increase shoulder ROM   Min Type Additional Details    [] Estim:  []Unatt       []IFC  []Premod                        []Other:  []w/ice   []w/heat  Position:  Location:    [] Estim: []Att    []TENS instruct  []NMES                    []Other:  []w/US   []w/ice   []w/heat  Position:  Location:    []  Traction: [] Cervical       []Lumbar                       [] Prone          []Supine                       []Intermittent   []Continuous Lbs:  [] before manual  [] after manual    []  Ultrasound: []Continuous   [] Pulsed                           []1MHz   []3MHz W/cm2:  Location:    []  Iontophoresis with dexamethasone         Location: [] Take home patch   [] In clinic    []  Ice     []  heat  []  Ice massage  []  Laser   []  Anodyne Position:in reclined  Location:(R) shoulder     []  Laser with stim  []  Other:  Position:  Location:   15+ 5min set up [x]  Vasopneumatic Device Pressure:       [] lo [x] med [] hi   Temperature: [x] lo [] med [] hi   [] Skin assessment post-treatment:  []intact []redness- no adverse reaction    []redness  adverse reaction:       25 min Therapeutic Exercise:  [x] See flow sheet :    Rationale: increase ROM and increase strength to improve the patients ability to use the (R) shoulder per protocol      25 min Manual Therapy: In semi-reclined position - PROM, STM to lateral scapula border, bicep, and pec minor  MET to correct (R) elevated 1st rib   Rationale: decrease pain, increase ROM, increase tissue extensibility and decrease edema  to (R) shoulder            15 (NC) With   [] TE   [] TA   [] neuro   [] other: Patient Education: [x] Review HEP    [] Progressed/Changed HEP based on:   [] positioning   [] body mechanics   [] transfers   [x] heat/ice application    [x] other: patient waited for therapist      Other Objective/Functional Measures:   PROM with flexion after 1st rib was able to achieve approx 120 of flexion     ASSESSMENT/Changes in Function:      Patient will continue to benefit from skilled PT services to modify and progress therapeutic interventions, address functional mobility deficits, address ROM deficits, address strength deficits, analyze and address soft tissue restrictions and instruct in home and community integration to attain remaining goals. [x]  See Plan of Care  []  See progress note/recertification  []  See Discharge Summary         Progress towards goals / Updated goals: · Short Term Goals: To be accomplished in  4  weeks:  1. Patient will demonstrate at least 140 degrees passive flexion. MET 10/9/17  2. Independent/compliant with appropriate HEP for RUE strength/ROM as per protocol. MET performing daily  10-11-17  3. Patient will report 50% improvement in sleep quality stemming from shoulder symptoms 25%10-13-17   · Long Term Goals: To be accomplished in  6-8  weeks:  1.   Pt will be able to progress to active assisted/AROM activities up to 90 degrees pending MD clearance  2. Improve FOTO outcome score by 20-25% to indicate a significant improvement in ADL function  3.   Patient will report > 50% functional improvement with washing, dressing ADL's     PLAN  []  Upgrade activities as tolerated     [x]  Continue plan of care  []  Update interventions per flow sheet       []  Discharge due to:_  []  Other:_  Please refer to progress report     Julia Saucedo PTA, PT, DPT   10/18/2017      Future Appointments  Date Time Provider Nancy Cantu   10/20/2017 10:30 AM Isabelle Winchester, PT [facility]

## 2017-10-20 ENCOUNTER — HOSPITAL ENCOUNTER (OUTPATIENT)
Dept: PHYSICAL THERAPY | Age: 57
Discharge: HOME | End: 2017-10-20
Payer: COMMERCIAL

## 2017-10-20 PROCEDURE — 97140 MANUAL THERAPY 1/> REGIONS: CPT | Performed by: PHYSICAL THERAPIST

## 2017-10-20 PROCEDURE — 97110 THERAPEUTIC EXERCISES: CPT | Performed by: PHYSICAL THERAPIST

## 2017-10-20 PROCEDURE — 97016 VASOPNEUMATIC DEVICE THERAPY: CPT | Performed by: PHYSICAL THERAPIST

## 2017-10-20 NOTE — PROGRESS NOTES
PT DAILY TREATMENT NOTE     Patient Name: Elkin Ho  Date:10/20/2017  : 1960  [x]  Patient  Verified  Payor: [employer] / Plan: [facility] PPO / Product Type: PPO /    In time: 1030 Out time: 1140  Total Treatment Time (min): 70  Visit #: 3  of  (10)    Treatment Area: S/P rotator cuff repair [Z98.890]  Right shoulder pain [M25.511]    SUBJECTIVE  Pain Level IN: (0-10 scale): 3-5/10  Pain Level OUT: (0-10 scale) post treatment: 210    Any medication changes, allergies to medications, adverse drug reactions, diagnosis change, or new procedure performed?: [x] No    [] Yes (see summary sheet for update)  Subjective functional status/changes:   [x] No changes reported  I have been washing my hair this morning so I am sore.     OBJECTIVE    Modality rationale: decrease edema, decrease inflammation and decrease pain to improve the patients ability to increase shoulder ROM   Min Type Additional Details    [] Estim:  []Unatt       []IFC  []Premod                        []Other:  []w/ice   []w/heat  Position:  Location:    [] Estim: []Att    []TENS instruct  []NMES                    []Other:  []w/US   []w/ice   []w/heat  Position:  Location:    []  Traction: [] Cervical       []Lumbar                       [] Prone          []Supine                       []Intermittent   []Continuous Lbs:  [] before manual  [] after manual    []  Ultrasound: []Continuous   [] Pulsed                           []1MHz   []3MHz W/cm2:  Location:    []  Iontophoresis with dexamethasone         Location: [] Take home patch   [] In clinic    []  Ice     []  heat  []  Ice massage  []  Laser   []  Anodyne Position:in reclined  Location:(R) shoulder     []  Laser with stim  []  Other:  Position:  Location:   15+ 5min set up [x]  Vasopneumatic Device Pressure:       [] lo [x] med [] hi   Temperature: [x] lo [] med [] hi   [] Skin assessment post-treatment:  []intact []redness- no adverse reaction    []redness  adverse reaction:       36 min Therapeutic Exercise:  [x] See flow sheet :    Rationale: increase ROM and increase strength to improve the patients ability to use the (R) shoulder per protocol      23 min Manual Therapy: In semi-reclined position - PROM, STM to lateral scapula border, bicep, and pec minor  MET to correct (R) elevated 1st rib   Rationale: decrease pain, increase ROM, increase tissue extensibility and decrease edema  to (R) shoulder            15 (NC) With   [] TE   [] TA   [] neuro   [] other: Patient Education: [x] Review HEP    [] Progressed/Changed HEP based on:   [] positioning   [] body mechanics   [] transfers   [x] heat/ice application    [x] other: patient waited for therapist      Other Objective/Functional Measures:   Patient continues to have a sharp painful episode at beginning fo MT with fwd flexion, performed first rib mobe with increased relaxation and no further painful episodes noted. Constant VC needed for relaxation with PROM, Limitations in ABD, flexion, and ER. Unable to measure as patient extremely sensitive to movements and requires steady 2 handed placement for PROM, due to highly apprehensive with elevation. ASSESSMENT/Changes in Function:      Patient will continue to benefit from skilled PT services to modify and progress therapeutic interventions, address functional mobility deficits, address ROM deficits, address strength deficits, analyze and address soft tissue restrictions and instruct in home and community integration to attain remaining goals. [x]  See Plan of Care  []  See progress note/recertification  []  See Discharge Summary         Progress towards goals / Updated goals: · Short Term Goals: To be accomplished in  4  weeks:  1. Patient will demonstrate at least 140 degrees passive flexion. MET 10/9/17  2. Independent/compliant with appropriate HEP for RUE strength/ROM as per protocol. MET performing daily  10-11-17  3.   Patient will report 50% improvement in sleep quality stemming from shoulder symptoms 25% 10-20-17   · Long Term Goals: To be accomplished in  6-8  weeks:  1. Pt will be able to progress to active assisted/AROM activities up to 90 degrees pending MD clearance  2. Improve FOTO outcome score by 20-25% to indicate a significant improvement in ADL function  3.   Patient will report > 50% functional improvement with washing, dressing ADL's     PLAN  []  Upgrade activities as tolerated     [x]  Continue plan of care  []  Update interventions per flow sheet       []  Discharge due to:_  []  Other:_  Please refer to progress report     Isabelle Winchester PT,    10/20/2017      Future Appointments  Date Time Provider Nancy Cantu   10/20/2017 10:30 AM Isabelle Winchester PT [facility]

## 2017-10-23 ENCOUNTER — HOSPITAL ENCOUNTER (OUTPATIENT)
Dept: PHYSICAL THERAPY | Age: 57
Discharge: HOME | End: 2017-10-23
Payer: COMMERCIAL

## 2017-10-23 PROCEDURE — 97016 VASOPNEUMATIC DEVICE THERAPY: CPT

## 2017-10-23 PROCEDURE — 97110 THERAPEUTIC EXERCISES: CPT

## 2017-10-23 PROCEDURE — 97140 MANUAL THERAPY 1/> REGIONS: CPT

## 2017-10-25 ENCOUNTER — HOSPITAL ENCOUNTER (OUTPATIENT)
Dept: PHYSICAL THERAPY | Age: 57
Discharge: HOME | End: 2017-10-25
Payer: COMMERCIAL

## 2017-10-25 PROCEDURE — 97140 MANUAL THERAPY 1/> REGIONS: CPT

## 2017-10-25 PROCEDURE — 97016 VASOPNEUMATIC DEVICE THERAPY: CPT

## 2017-10-25 PROCEDURE — 97110 THERAPEUTIC EXERCISES: CPT

## 2017-10-25 NOTE — PROGRESS NOTES
PT DAILY TREATMENT NOTE     Patient Name: Ray Ho  Date:10/25/2017  : 1960  [x]  Patient  Verified  Payor: Anna Montero / Plan: Chip Morales [id number] PPO / Product Type: PPO /    In time: 4:35 Out time: 5:35  Total Treatment Time (min): 60  Visit #: 7  of - (12)    Treatment Area: S/P rotator cuff repair [Z98.890]  Right shoulder pain [M25.511]    SUBJECTIVE  Pain Level IN: (0-10 scale): 3/10  Pain Level OUT: (0-10 scale) post treatment: 10    Any medication changes, allergies to medications, adverse drug reactions, diagnosis change, or new procedure performed?: [x] No    [] Yes (see summary sheet for update)  Subjective functional status/changes:   [] No changes reported   I am feeling better, but last night I woke up and noticed that I unbuckled my sling from my side and I have no idea how I did that     OBJECTIVE    Modality rationale: decrease edema, decrease inflammation and decrease pain to improve the patients ability to increase shoulder ROM   Min Type Additional Details    [] Estim:  []Unatt       []IFC  []Premod                        []Other:  []w/ice   []w/heat  Position:  Location:    [] Estim: []Att    []TENS instruct  []NMES                    []Other:  []w/US   []w/ice   []w/heat  Position:  Location:    []  Traction: [] Cervical       []Lumbar                       [] Prone          []Supine                       []Intermittent   []Continuous Lbs:  [] before manual  [] after manual    []  Ultrasound: []Continuous   [] Pulsed                           []1MHz   []3MHz W/cm2:  Location:    []  Iontophoresis with dexamethasone         Location: [] Take home patch   [] In clinic    []  Ice     []  heat  []  Ice massage  []  Laser   []  Anodyne Position:in reclined  Location:(R) shoulder     []  Laser with stim  []  Other:  Position:  Location:   15+ 5min set up [x]  Vasopneumatic Device Pressure:       [] lo [x] med [] hi   Temperature: [x] lo [] med [] hi   [] Skin assessment post-treatment:  []intact []redness- no adverse reaction    []redness  adverse reaction:       20 min Therapeutic Exercise:  [x] See flow sheet :   Added passive table slides with arm in scaption    Rationale: increase ROM and increase strength to improve the patients ability to use the (R) shoulder per protocol      15 min Manual Therapy: In semi-reclined position - PROM   Rationale: decrease pain, increase ROM, increase tissue extensibility and decrease edema  to (R) shoulder            5  With   [] TE   [] TA   [] neuro   [] other: Patient Education: [x] Review HEP    [] Progressed/Changed HEP based on:   [] positioning   [] body mechanics   [] transfers   [x] heat/ice application    [x] other:  Patient had to wait for therapist      Other Objective/Functional Measures: added passive table slide - with hand on the table and pushing body away with use of rolling stool and feet to maintaining passive stretch     ASSESSMENT/Changes in Function:      Patient will continue to benefit from skilled PT services to modify and progress therapeutic interventions, address functional mobility deficits, address ROM deficits, address strength deficits, analyze and address soft tissue restrictions and instruct in home and community integration to attain remaining goals. [x]  See Plan of Care  []  See progress note/recertification  []  See Discharge Summary         Progress towards goals / Updated goals: · Short Term Goals: To be accomplished in  4  weeks:  1. Patient will demonstrate at least 140 degrees passive flexion. MET 10/9/17  2. Independent/compliant with appropriate HEP for RUE strength/ROM as per protocol. MET performing daily  10-11-17  3. Patient will report 50% improvement in sleep quality stemming from shoulder symptoms 25% 10-20-17   · Long Term Goals: To be accomplished in  6-8  weeks:  1.   Pt will be able to progress to active assisted/AROM activities up to 90 degrees pending MD clearance  2. Improve FOTO outcome score by 20-25% to indicate a significant improvement in ADL function  3.   Patient will report > 50% functional improvement with washing, dressing ADL's     PLAN  []  Upgrade activities as tolerated     [x]  Continue plan of care  []  Update interventions per flow sheet       []  Discharge due to:_  []  Other:_  Please refer to progress report     Esther Whaley PTA,    10/25/2017      Future Appointments  Date Time Provider Nancy Cantu   10/27/2017 9:00 AM Julia Wagoner DPT [facility]   10[facility]30/2017 5:30 [facility]   11/1/2017 8:00 AM Deven Hastings PT [facility]   11/3/2017 9:00 AM [facility] PT THALIA Rojo [facility]

## 2017-10-27 ENCOUNTER — HOSPITAL ENCOUNTER (OUTPATIENT)
Dept: PHYSICAL THERAPY | Age: 57
Discharge: HOME | End: 2017-10-27
Payer: COMMERCIAL

## 2017-10-27 PROCEDURE — 97110 THERAPEUTIC EXERCISES: CPT

## 2017-10-27 PROCEDURE — 97140 MANUAL THERAPY 1/> REGIONS: CPT

## 2017-10-27 PROCEDURE — 97016 VASOPNEUMATIC DEVICE THERAPY: CPT

## 2017-10-27 NOTE — PROGRESS NOTES
PT DAILY TREATMENT NOTE     Patient Name: Ramon Ho  Date:10/27/2017  : 1960  [x]  Patient  Verified  Payor: [employer]UE [employer] / Plan: [facility] PPO / Product Type: PPO /    In time: 9:02  Out time: 10:15  Total Treatment Time (min): 73  Visit #: 8 of -    Treatment Area: S/P rotator cuff repair [Z98.890]  Right shoulder pain [M25.511]    SUBJECTIVE  Pain Level (0-10 scale): .5-1/10  Any medication changes, allergies to medications, adverse drug reactions, diagnosis change, or new procedure performed?: [x] No    [] Yes (see summary sheet for update)  Subjective functional status/changes:   [x] No changes reported    Sleeping in the brace is so inconvenient      OBJECTIVE    Modality rationale: decrease edema, decrease inflammation and decrease pain to reduce the patients irritability in R shoulder following therapy    Min Type Additional Details    [] Estim:  []Unatt       []IFC  []Premod                        []Other:  []w/ice   []w/heat  Position:  Location:    [] Estim: []Att    []TENS instruct  []NMES                    []Other:  []w/US   []w/ice   []w/heat  Position:  Location:    []  Traction: [] Cervical       []Lumbar                       [] Prone          []Supine                       []Intermittent   []Continuous Lbs:  [] before manual  [] after manual    []  Ultrasound: []Continuous   [] Pulsed                           []1MHz   []3MHz W/cm2:  Location:    []  Iontophoresis with dexamethasone         Location: [] Take home patch   [] In clinic    []  Ice     []  heat  []  Ice massage  []  Laser   []  Anodyne Position:  Location:    []  Laser with stim  []  Other:  Position:  Location:   15 [x]  Vasopneumatic Device Pressure:       [] lo [x] med [] hi   Temperature: [x] lo [] med [] hi   [x] Skin assessment post-treatment:  [x]intact []redness- no adverse reaction    []redness  adverse reaction:       20 min Therapeutic Exercise:  [x] See flow sheet :   Rationale: increase ROM and increase strength to improve the patients ability to independently move and stabilize shoulder as tolerated per MD protocol      38 min Manual Therapy:  PROM R Scapula mobilization in supine: medial/superior/inferior translations, upward rotations,  R Shoulder PROM in supine: External/Internal rotation, Flexion, Abduction   Rationale: decrease pain, increase ROM, increase tissue extensibility and decrease edema  to R shoulder to promote functional mobility. With   [x] TE   [] TA   [] neuro   [] other: Patient Education: [x] Review HEP    [x] Progressed/Changed HEP based on:   [] positioning   [] body mechanics   [] transfers   [] heat/ice application    [] other: Gave patient putty to work on hand/wrist strength and function at home     Other Objective/Functional Measures:      Patient reported severe pain when returning arm to neutral position following R shoulder flexion PROM, cueing patient to flex elbow and perform isometric shoulder extension into therapist while returning to neutral was very helpful       Patients R davidy was unable to consistently flex against the resistance of the blue Dynagrip, patient was given putty for addressing this deficit while at home    Pain Level (0-10 scale) post treatment: 1/10    ASSESSMENT/Changes in Function:     Mrs. Karen Mccartney reports pain of .5-1/10 with her only real complaints being the inconvenience of sleeping in her brace. When returning the patient to neutral following PROM of R shoulder flexion she had severe pain, secondary to increased muscle guarding and eccentric lowering possibly due to patients fear/expectation of pain reproduction with this movement. The pain subsided once the arm was back in neutral. Performing flexion in the scapular plane versus pure sagittal plane movement may reduce apprehension and muscle guarding in subsequent visits.  She was given putty to work on hand/wrist strength and function after strength discrepancies between digits were noted when progressing Dynagrip resistance. Patient will continue to benefit from skilled PT services to modify and progress therapeutic interventions, address functional mobility deficits, address ROM deficits, address strength deficits, analyze and address soft tissue restrictions and assess and modify postural abnormalities to attain remaining goals. Progress towards goals / Updated goals: · Short Term Goals: To be accomplished in  4  weeks:  1.  Patient will demonstrate at least 140 degrees passive flexion. MET 10/9/17  2.  Independent/compliant with appropriate HEP for RUE strength/ROM as per protocol. MET performing daily  10-11-17  3.  Patient will report 50% improvement in sleep quality stemming from shoulder symptoms 25% 10-20-17   · Long Term Goals:  To be accomplished in  6-8  weeks:  1.  Pt will be able to progress to active assisted/AROM activities up to 90 degrees pending MD clearance  2.  Improve FOTO outcome score by 20-25% to indicate a significant improvement in ADL function  3.  Patient will report > 50% functional improvement with washing, dressing ADL's    PLAN  [x]  Upgrade activities as tolerated     [x]  Continue plan of care  []  Update interventions per flow sheet       []  Discharge due to:_  []  Other:_      Dedrick Abdul 10/27/2017  8:36 AM    Future Appointments  Date Time Provider Nancy Cantu   10/27/2017 9:00 AM Sulma Anaya DPT [facility]   10/30/2017 5:30 PM Juan Manuel Vincent PTA [facility]   11/1/2017 8:00 AM Eileen Tran PT [facility]   11/3/2017 9:00 AM [facility] PT THALIA 1 [facility]

## 2017-10-30 ENCOUNTER — HOSPITAL ENCOUNTER (OUTPATIENT)
Dept: PHYSICAL THERAPY | Age: 57
Discharge: HOME | End: 2017-10-30
Payer: COMMERCIAL

## 2017-10-30 PROCEDURE — 97016 VASOPNEUMATIC DEVICE THERAPY: CPT

## 2017-10-30 PROCEDURE — 97140 MANUAL THERAPY 1/> REGIONS: CPT

## 2017-10-30 NOTE — PROGRESS NOTES
PT DAILY TREATMENT NOTE     Patient Name: Steffanie Ho  Date:10/30/2017  : 1960  [x]  Patient  Verified  Payor: [employer]UE [employer] / Plan: Chip Morales [id number] PPO / Product Type: PPO /    In time: 5:35 Out time: 6:35  Total Treatment Time (min): 60  Visit #:9 of  (12)    Treatment Area: S/P rotator cuff repair [Z98.890]  Right shoulder pain [M25.511]    SUBJECTIVE  Pain Level IN: (0-10 scale): 0-1/10  Pain Level OUT: (0-10 scale) post treatment: 0/10    Any medication changes, allergies to medications, adverse drug reactions, diagnosis change, or new procedure performed?: [x] No    [] Yes (see summary sheet for update)  Subjective functional status/changes:   [] No changes reported   I think it is better today - I know it is a lot better than it was the last time I was here    OBJECTIVE    Modality rationale: decrease edema, decrease inflammation and decrease pain to improve the patients ability to increase shoulder ROM   Min Type Additional Details    [] Estim:  []Unatt       []IFC  []Premod                        []Other:  []w/ice   []w/heat  Position:  Location:    [] Estim: []Att    []TENS instruct  []NMES                    []Other:  []w/US   []w/ice   []w/heat  Position:  Location:    []  Traction: [] Cervical       []Lumbar                       [] Prone          []Supine                       []Intermittent   []Continuous Lbs:  [] before manual  [] after manual    []  Ultrasound: []Continuous   [] Pulsed                           []1MHz   []3MHz W/cm2:  Location:    []  Iontophoresis with dexamethasone         Location: [] Take home patch   [] In clinic    []  Ice     []  heat  []  Ice massage  []  Laser   []  Anodyne Position:in reclined  Location:(R) shoulder     []  Laser with stim  []  Other:  Position:  Location:   15+ 5min set up [x]  Vasopneumatic Device Pressure:       [] lo [x] med [] hi   Temperature: [x] lo [] med [] hi   [] Skin assessment post-treatment:  []intact []redness- no adverse reaction    []redness  adverse reaction:       15 min Therapeutic Exercise:  [x] See flow sheet :      Rationale: increase ROM and increase strength to improve the patients ability to use the (R) shoulder per protocol      25 min Manual Therapy: In semi-reclined position - PROM   Rationale: decrease pain, increase ROM, increase tissue extensibility and decrease edema  to (R) shoulder              With   [] TE   [] TA   [] neuro   [] other: Patient Education: [x] Review HEP    [] Progressed/Changed HEP based on:   [] positioning   [] body mechanics   [] transfers   [x] heat/ice application    [] other:      Other Objective/Functional Measures: patient presented with good tolerance with PROM - did gently correct (R) 1st elevated rib and 2nd anterior rib prior to PROM - patient did not experience any \"catching\" with passive ascending or descending with flexion and scaption       ASSESSMENT/Changes in Function:      Patient will continue to benefit from skilled PT services to modify and progress therapeutic interventions, address functional mobility deficits, address ROM deficits, address strength deficits, analyze and address soft tissue restrictions and instruct in home and community integration to attain remaining goals. [x]  See Plan of Care  []  See progress note/recertification  []  See Discharge Summary         Progress towards goals / Updated goals: · Short Term Goals: To be accomplished in  4  weeks:  1. Patient will demonstrate at least 140 degrees passive flexion. MET 10/9/17  2. Independent/compliant with appropriate HEP for RUE strength/ROM as per protocol. MET performing daily  10-11-17  3. Patient will report 50% improvement in sleep quality stemming from shoulder symptoms 25% 10-20-17   · Long Term Goals: To be accomplished in  6-8  weeks:  1. Pt will be able to progress to active assisted/AROM activities up to 90 degrees pending MD clearance  2.   Improve FOTO outcome score by 20-25% to indicate a significant improvement in ADL function  3.   Patient will report > 50% functional improvement with washing, dressing ADL's     PLAN  []  Upgrade activities as tolerated     [x]  Continue plan of care  []  Update interventions per flow sheet       []  Discharge due to:_  []  Other:_  Please refer to progress report     Stephanie Bunn PTA,    10/30/2017      Future Appointments  Date Time Provider Nancy Cantu   11/1/2017 8:00 AM Ellison Najjar, PT [facility]   11/3/2017 9:00 AM [facility] [place] [facility]

## 2017-11-01 ENCOUNTER — HOSPITAL ENCOUNTER (OUTPATIENT)
Dept: PHYSICAL THERAPY | Age: 57
Discharge: HOME | End: 2017-11-01
Payer: COMMERCIAL

## 2017-11-01 PROCEDURE — 97016 VASOPNEUMATIC DEVICE THERAPY: CPT

## 2017-11-01 PROCEDURE — 97140 MANUAL THERAPY 1/> REGIONS: CPT

## 2017-11-01 PROCEDURE — 97110 THERAPEUTIC EXERCISES: CPT

## 2017-11-01 NOTE — PROGRESS NOTES
PT DAILY TREATMENT NOTE     Patient Name: Brittany Ho  Date:2017  : 1960  [x]  Patient  Verified  Payor: [employer] / Plan: Chip Morales [id number] PPO / Product Type: PPO /    In time: 7:30 Out time: 8:55  Total Treatment Time (min): 85  Visit #:10 of  (12)    Treatment Area: S/P rotator cuff repair [Z98.890]  Right shoulder pain [M25.511]    SUBJECTIVE  Pain Level IN: (0-10 scale): 0/10  Pain Level OUT: (0-10 scale) post treatment: 0/10    Any medication changes, allergies to medications, adverse drug reactions, diagnosis change, or new procedure performed?: [x] No    [] Yes (see summary sheet for update)  Subjective functional status/changes:   [] No changes reported   I am doing pretty good this morning - I have been able to sleep flat for the past two night and it has been good - I bought that postural brace but I think it is too bulky for me to wear     OBJECTIVE    Modality rationale: decrease edema, decrease inflammation and decrease pain to improve the patients ability to increase shoulder ROM   Min Type Additional Details    [] Estim:  []Unatt       []IFC  []Premod                        []Other:  []w/ice   []w/heat  Position:  Location:    [] Estim: []Att    []TENS instruct  []NMES                    []Other:  []w/US   []w/ice   []w/heat  Position:  Location:    []  Traction: [] Cervical       []Lumbar                       [] Prone          []Supine                       []Intermittent   []Continuous Lbs:  [] before manual  [] after manual    []  Ultrasound: []Continuous   [] Pulsed                           []1MHz   []3MHz W/cm2:  Location:    []  Iontophoresis with dexamethasone         Location: [] Take home patch   [] In clinic    []  Ice     []  heat  []  Ice massage  []  Laser   []  Anodyne Position:in reclined  Location:(R) shoulder     []  Laser with stim  []  Other:  Position:  Location:   15+ 5min set up [x]  Vasopneumatic Device Pressure:       [] lo [x] med [] hi   Temperature: [x] lo [] med [] hi   [] Skin assessment post-treatment:  []intact []redness- no adverse reaction    []redness  adverse reaction:       30/20 min Therapeutic Exercise:  [x] See flow sheet : 10 min NC for warm up     Rationale: increase ROM and increase strength to improve the patients ability to use the (R) shoulder per protocol      35 min Manual Therapy: In semi-reclined position - PROM  1st rib elevated mob and 2nd anterior rib MET  And gentle [facility] mobs   Rationale: decrease pain, increase ROM, increase tissue extensibility and decrease edema  to (R) shoulder              With   [] TE   [] TA   [] neuro   [] other: Patient Education: [x] Review HEP    [] Progressed/Changed HEP based on:   [] positioning   [] body mechanics   [] transfers   [x] heat/ice application    [] other:      Other Objective/Functional Measures: patient experienced once episode of pain/discomfort with PROM - felt as a bony block - cleared the 1st and 2nd rib and then was able to achieve greater PROM into flexion and scaption     ASSESSMENT/Changes in Function:      Patient will continue to benefit from skilled PT services to modify and progress therapeutic interventions, address functional mobility deficits, address ROM deficits, address strength deficits, analyze and address soft tissue restrictions and instruct in home and community integration to attain remaining goals. [x]  See Plan of Care  []  See progress note/recertification  []  See Discharge Summary         Progress towards goals / Updated goals: · Short Term Goals: To be accomplished in  4  weeks:  1. Patient will demonstrate at least 140 degrees passive flexion. MET 10/9/17  2. Independent/compliant with appropriate HEP for RUE strength/ROM as per protocol. MET performing daily  10-11-17  3. Patient will report 50% improvement in sleep quality stemming from shoulder symptoms 25% 10-20-17   · Long Term Goals: To be accomplished in  6-8  weeks:  1.   Pt will be able to progress to active assisted/AROM activities up to 90 degrees pending MD clearance  2. Improve FOTO outcome score by 20-25% to indicate a significant improvement in ADL function  3.   Patient will report > 50% functional improvement with washing, dressing ADL's     PLAN  []  Upgrade activities as tolerated     [x]  Continue plan of care  []  Update interventions per flow sheet       []  Discharge due to:_  []  Other:_  Please refer to progress report     Juan Manuel Vincent PTA,    11/1/2017      Future Appointments  Date Time Provider Nancy Cantu   11/3/2017 9:00 AM [address] [facility]

## 2017-11-03 ENCOUNTER — HOSPITAL ENCOUNTER (OUTPATIENT)
Dept: PHYSICAL THERAPY | Age: 57
Discharge: HOME | End: 2017-11-03
Payer: COMMERCIAL

## 2017-11-03 PROCEDURE — 97140 MANUAL THERAPY 1/> REGIONS: CPT

## 2017-11-03 PROCEDURE — 97016 VASOPNEUMATIC DEVICE THERAPY: CPT

## 2017-11-03 NOTE — PROGRESS NOTES
PT DAILY TREATMENT NOTE     Patient Name: Christopher Ho  Date:11/3/2017  : 1960  [x]  Patient  Verified  Payor: BLUE [employer]OSS / Plan: [facility] PPO / Product Type: PPO /    In time: 9:10 Out time: 10:08  Total Treatment Time (min): 58  Visit #:10 of  (12)    Treatment Area: S/P rotator cuff repair [Z98.890]  Right shoulder pain [M25.511]    SUBJECTIVE  Pain Level IN: (0-10 scale): 0/10  Pain Level OUT: (0-10 scale) post treatment: 0/10    Any medication changes, allergies to medications, adverse drug reactions, diagnosis change, or new procedure performed?: [x] No    [] Yes (see summary sheet for update)  Subjective functional status/changes:   [] No changes reported  Pt reports overall improvement with sleep with the exception of last night.     OBJECTIVE    Modality rationale: decrease edema, decrease inflammation and decrease pain to improve the patients ability to increase shoulder ROM   Min Type Additional Details    [] Estim:  []Unatt       []IFC  []Premod                        []Other:  []w/ice   []w/heat  Position:  Location:    [] Estim: []Att    []TENS instruct  []NMES                    []Other:  []w/US   []w/ice   []w/heat  Position:  Location:    []  Traction: [] Cervical       []Lumbar                       [] Prone          []Supine                       []Intermittent   []Continuous Lbs:  [] before manual  [] after manual    []  Ultrasound: []Continuous   [] Pulsed                           []1MHz   []3MHz W/cm2:  Location:    []  Iontophoresis with dexamethasone         Location: [] Take home patch   [] In clinic    []  Ice     []  heat  []  Ice massage  []  Laser   []  Anodyne Position:in reclined  Location:(R) shoulder     []  Laser with stim  []  Other:  Position:  Location:   15+ 5min set up [x]  Vasopneumatic Device Pressure:       [] lo [x] med [] hi   Temperature: [x] lo [] med [] hi   [] Skin assessment post-treatment:  []intact []redness- no adverse reaction    []redness  adverse reaction:       3 min Therapeutic Exercise:  [x] See flow sheet :      Rationale: increase ROM and increase strength to improve the patients ability to use the (R) shoulder per protocol      35 min Manual Therapy:  PROM, MFR/TrP Subscap  And gentle GH mobs   Rationale: decrease pain, increase ROM, increase tissue extensibility and decrease edema  to (R) shoulder              With   [] TE   [] TA   [] neuro   [] other: Patient Education: [x] Review HEP    [] Progressed/Changed HEP based on:   [] positioning   [] body mechanics   [] transfers   [x] heat/ice application    [] other:      Other Objective/Functional Measures:   PROM:  Flexion: 135  ER: 18 (21 after manual to Subscap)  ABD: 68      ASSESSMENT/Changes in Function: Pt is 5 weeks c/p RTC repair. Pt demo'd slightly less flexion than previous session due to some stiffness and pain. Pt still limited with ER likely due to subscap hypertonicity and tightness. Next visit address subscap and progress pt to [facility] per protocol    Patient will continue to benefit from skilled PT services to modify and progress therapeutic interventions, address functional mobility deficits, address ROM deficits, address strength deficits, analyze and address soft tissue restrictions and instruct in home and community integration to attain remaining goals. []  See Plan of Care  [x]  See progress note/recertification  []  See Discharge Summary         Progress towards goals / Updated goals: · Short Term Goals: To be accomplished in  4  weeks:  1. Patient will demonstrate at least 140 degrees passive flexion. MET 10/9/17  2. Independent/compliant with appropriate HEP for RUE strength/ROM as per protocol. MET performing daily  10-11-17  3. Patient will report 50% improvement in sleep quality stemming from shoulder symptoms MET 70% 11-03-17   · Long Term Goals: To be accomplished in  6-8  weeks:  1.   Pt will be able to progress to active assisted/AROM activities up to 90 degrees pending MD clearance   2. Improve FOTO outcome score by 20-25% to indicate a significant improvement in ADL function MET 29% Improvement 11-03-17   3.   Patient will report > 50% functional improvement with washing, dressing ADL's     PLAN  []  Upgrade activities as tolerated     [x]  Continue plan of care  []  Update interventions per flow sheet       []  Discharge due to:_  []  Other:_  Please refer to progress report     Leida Britton PT, DPT    11/3/2017      Future Appointments  Date Time Provider Nancy Cantu   11/6/2017 7:00 AM Kalpana Aaron PTA [facility]

## 2017-11-03 NOTE — PROGRESS NOTES
[address] [facility] [address] [facility] [address] [address], Miguel ledesma, Nadine Davison  Phone [phone]  Fax [phone]  PROGRESS NOTE  Patient Name: Ray Ho : 1960   Treatment/Medical Diagnosis: S/P rotator cuff repair [Z98.890]  Right shoulder pain [M25.511]   Referral Source: Aziza Sharma MD     Date of Initial Visit: 2017 Attended Visits: 17 Missed Visits: 0     SUMMARY OF TREATMENT  Treatment has included therapeutic stretching,manual/massage and modalities as need to assist with decreasing pain and increasing PROM per protocol  CURRENT STATUS  PROM:  Flexion: 135  ER: 18 (21 after manual to Subscap)  ABD: 68    Pt is 5 weeks c/p RTC repair. Pt demo'd slightly less flexion than previous session due to some stiffness and pain. Pt still limited with ER likely due to subscap hypertonicity and tightness. · Short Term Goals: To be accomplished in  4  weeks:  1.  Patient will demonstrate at least 140 degrees passive flexion. MET 10/9/17  2.  Independent/compliant with appropriate HEP for RUE strength/ROM as per protocol. MET performing daily  10-11-17  3.  Patient will report 50% improvement in sleep quality stemming from shoulder symptoms MET 70% 17   · Long Term Goals: To be accomplished in  6-8  weeks:  1.  Pt will be able to progress to active assisted/AROM activities up to 90 degrees pending MD clearance   2.  Improve FOTO outcome score by 20-25% to indicate a significant improvement in ADL function MET 29% Improvement 17   3.  Patient will report > 50% functional improvement with washing, dressing ADL's    New Goals to be achieved in __4__  weeks:  1. Pt will be able to progress to active assisted/AROM activities up to 90 degrees pending MD clearance    2. Patient will report > 50% functional improvement with washing, dressing ADL's   3.   Patient will improve AROM to WNL compared to L UE to improve performance of ADLs     G-Codes: NA  RECOMMENDATIONS  Continue with above POC and current protocol for 2 to 3 times a week for 4 weeks to achieve above goals  If you have any questions/comments please contact us directly at [phone]. Thank you for allowing us to assist in the care of your patient. Therapist Signature: Brittany Sen Date: 11/3/2017     Time: 1:35 PM   NOTE TO PHYSICIAN:  PLEASE COMPLETE THE ORDERS BELOW AND FAX TO   [facility] [facility]: [phone]. If you are unable to process this request in 24 hours please contact our office: [phone].    ___ I have read the above report and request that my patient continue as recommended.   ___ I have read the above report and request that my patient continue therapy with the following changes/special instructions:_________________________________________________________   ___ I have read the above report and request that my patient be discharged from therapy.      Physician Signature:        Date:       Time:

## 2017-11-06 ENCOUNTER — HOSPITAL ENCOUNTER (OUTPATIENT)
Dept: PHYSICAL THERAPY | Age: 57
Discharge: HOME | End: 2017-11-06
Payer: COMMERCIAL

## 2017-11-06 PROCEDURE — 97016 VASOPNEUMATIC DEVICE THERAPY: CPT

## 2017-11-06 PROCEDURE — 97140 MANUAL THERAPY 1/> REGIONS: CPT

## 2017-11-06 PROCEDURE — 97110 THERAPEUTIC EXERCISES: CPT

## 2017-11-06 NOTE — PROGRESS NOTES
PT DAILY TREATMENT NOTE     Patient Name: Gabby Ho  Date:2017  : 1960  [x]  Patient  Verified  Payor: [employer] / Plan: Chip Morales [id number] PPO / Product Type: PPO /    In time: 7:00 Out time: 8:15  Total Treatment Time (min): 75  Visit #:1 of      Treatment Area: S/P rotator cuff repair [Z98.890]  Right shoulder pain [M25.511]    SUBJECTIVE  Pain Level IN: (0-10 scale): 0/10  Pain Level OUT: (0-10 scale) post treatment: 0/10    Any medication changes, allergies to medications, adverse drug reactions, diagnosis change, or new procedure performed?: [x] No    [] Yes (see summary sheet for update)  Subjective functional status/changes:   [] No changes reported   I see the doctor tomorrow - I am 6 weeks post-op today     OBJECTIVE    Modality rationale: decrease edema, decrease inflammation and decrease pain to improve the patients ability to increase shoulder ROM   Min Type Additional Details    [] Estim:  []Unatt       []IFC  []Premod                        []Other:  []w/ice   []w/heat  Position:  Location:    [] Estim: []Att    []TENS instruct  []NMES                    []Other:  []w/US   []w/ice   []w/heat  Position:  Location:    []  Traction: [] Cervical       []Lumbar                       [] Prone          []Supine                       []Intermittent   []Continuous Lbs:  [] before manual  [] after manual    []  Ultrasound: []Continuous   [] Pulsed                           []1MHz   []3MHz W/cm2:  Location:    []  Iontophoresis with dexamethasone         Location: [] Take home patch   [] In clinic    []  Ice     []  heat  []  Ice massage  []  Laser   []  Anodyne Position:in reclined  Location:(R) shoulder     []  Laser with stim  []  Other:  Position:  Location:   15+ 5min set up [x]  Vasopneumatic Device Pressure:       [] lo [x] med [] hi   Temperature: [x] lo [] med [] hi   [] Skin assessment post-treatment:  []intact []redness- no adverse reaction    []redness  adverse reaction:       35 min Therapeutic Exercise:  [x] See flow sheet :  Progress to 6 week phase of protocol - AROM, AAROM  Pulleys  Finger ladder  Isometrics  Wand       Rationale: increase ROM and increase strength to improve the patients ability to use the (R) shoulder per protocol      20 min Manual Therapy: In semi-reclined position and side lying  - PROM  And gentle GH mobs   Rationale: decrease pain, increase ROM, increase tissue extensibility and decrease edema  to (R) shoulder              With   [] TE   [] TA   [] neuro   [] other: Patient Education: [x] Review HEP    [] Progressed/Changed HEP based on:   [] positioning   [] body mechanics   [] transfers   [x] heat/ice application    [] other:      Other Objective/Functional Measures: patient is 6 weeks post-op today - able to advance to next phase in protocol - patient tolerated all new exercises well however was very cautious and careful with performing AAROM exercise secondary to some fear to perform the exercises that may cause some increase pain     ASSESSMENT/Changes in Function:      Patient will continue to benefit from skilled PT services to modify and progress therapeutic interventions, address functional mobility deficits, address ROM deficits, address strength deficits, analyze and address soft tissue restrictions and instruct in home and community integration to attain remaining goals. [x]  See Plan of Care  []  See progress note/recertification  []  See Discharge Summary         Progress towards goals / Updated goals 11/3/17   New Goals to be achieved in __4__  weeks:  1. Pt will be able to progress to active assisted/AROM activities up to 90 degrees pending MD clearance    2. Patient will report > 50% functional improvement with washing, dressing ADL's   3.   Patient will improve AROM to WNL compared to L UE to improve performance of ADLs        PLAN  [x]  Upgrade activities as tolerated     [x]  Continue plan of care  []  Update interventions per flow sheet       []  Discharge due to:_  []  Other:    Ysabel Melara PTA,    11/6/2017      Future Appointments  Date Time Provider Nancy Cantu   11/7/2017 2:00 PM Farrell Goldmann, DPT [facility]   11/10/2017 10:00 AM [facility] PT [place] [id number] [facility]

## 2017-11-07 ENCOUNTER — APPOINTMENT (OUTPATIENT)
Dept: PHYSICAL THERAPY | Age: 57
End: 2017-11-07
Payer: COMMERCIAL

## 2017-11-08 ENCOUNTER — HOSPITAL ENCOUNTER (OUTPATIENT)
Dept: PHYSICAL THERAPY | Age: 57
Discharge: HOME | End: 2017-11-08
Payer: COMMERCIAL

## 2017-11-08 PROCEDURE — 97110 THERAPEUTIC EXERCISES: CPT

## 2017-11-08 PROCEDURE — 97016 VASOPNEUMATIC DEVICE THERAPY: CPT

## 2017-11-08 PROCEDURE — 97140 MANUAL THERAPY 1/> REGIONS: CPT

## 2017-11-08 NOTE — PROGRESS NOTES
PT DAILY TREATMENT NOTE     Patient Name: Steffanie Ho  Date:2017  : 1960  [x]  Patient  Verified  Payor: BLUE CROSS / Plan: [facility] Morales [id number] PPO / Product Type: PPO /    In time:12:00 Out time:1:09  Total Treatment Time (min): 71  Visit #: 2 of     Treatment Area: S/P rotator cuff repair [Z98.890]  Right shoulder pain [M25.511]    SUBJECTIVE  Pain Level (0-10 scale): 0/10  Any medication changes, allergies to medications, adverse drug reactions, diagnosis change, or new procedure performed?: [x] No    [] Yes (see summary sheet for update)  Subjective functional status/changes: The doctor cleared me to no longer wear the brace and to drive again.     OBJECTIVE    Modality rationale: decrease edema and decrease inflammation to reduce irritability improve the patients functional capacity following her therapy session   Min Type Additional Details    [] Estim:  []Unatt       []IFC  []Premod                        []Other:  []w/ice   []w/heat  Position:  Location:    [] Estim: []Att    []TENS instruct  []NMES                    []Other:  []w/US   []w/ice   []w/heat  Position:  Location:    []  Traction: [] Cervical       []Lumbar                       [] Prone          []Supine                       []Intermittent   []Continuous Lbs:  [] before manual  [] after manual    []  Ultrasound: []Continuous   [] Pulsed                           []1MHz   []3MHz W/cm2:  Location:    []  Iontophoresis with dexamethasone         Location: [] Take home patch   [] In clinic    []  Ice     []  heat  []  Ice massage  []  Laser   []  Anodyne Position:  Location:    []  Laser with stim  []  Other:  Position:  Location:   15 min [x]  Vasopneumatic Device Pressure:       [] lo [x] med [] hi   Temperature: [x] lo [] med [] hi   [x] Skin assessment post-treatment:  [x]intact []redness- no adverse reaction    []redness  adverse reaction:     34 min Therapeutic Exercise:  [x] See flow sheet : Rationale: increase ROM and increase strength to improve the patients ability to independently move and stabilize her shoulder as tolerated per MD protocol. 25 min min Manual Therapy:  R Shoulder PROM in supine: External/Internal rotation, Flexion PROM R Scapula mobilization in supine: medial/superior/inferior translations and upward rotations   Rationale: decrease pain, increase ROM and increase tissue extensibility of R shoulder to promote functional mobility. With   [] TE   [] TA   [] neuro   [] other: Patient Education: [x] Review HEP    [] Progressed/Changed HEP based on:   [] positioning   [] body mechanics   [] transfers   [] heat/ice application    [] other:      Other Objective/Functional Measures:     Patient was observed holding her R arm across chest similar to the brace position multiple times, likely to relieve increased stress on soft tissue structures since discontinuing the use of her brace     Pain Level (0-10 scale) post treatment: 0/10    ASSESSMENT/Changes in Function:     Ms. Bulmaro Colón reports to therapy no longer wearing a shoulder brace, she was cleared to remove it and continue driving yesterday by her doctor. Her pain was not increased and she only complained of some tightness since discontinuing the use of her brace. She will conitunue to benefit from PROM exercises, AAROM, and exercise progression per MD protocol to regain functional mobility and use of her R UE without an exacerbation of symptoms. She is still cautious going into flexion above 90 due to fear of pain reproduction. Patient will continue to benefit from skilled PT services to modify and progress therapeutic interventions, address functional mobility deficits, address ROM deficits, address strength deficits, analyze and address soft tissue restrictions, analyze and cue movement patterns and assess and modify postural abnormalities to attain remaining goals.     Progress towards goals / Updated goals:  New Goals to be achieved in __4__  weeks:  1.  Pt will be able to progress to active assisted/AROM activities up to 90 degrees pending MD clearance    2.  Patient will report > 50% functional improvement with washing, dressing ADL's   3.  Patient will improve AROM to WNL compared to L UE to improve performance of ADLs       PLAN  [x]  Upgrade activities as tolerated     [x]  Continue plan of care  []  Update interventions per flow sheet       []  Discharge due to:_  [x]  Other: Assess goals    Jayy Yates 11/8/2017  12:03 PM    Future Appointments  Date Time Provider Nancy Cantu   11/10/2017 10:00 AM [address] 1 [facility]

## 2017-11-10 ENCOUNTER — HOSPITAL ENCOUNTER (OUTPATIENT)
Dept: PHYSICAL THERAPY | Age: 57
Discharge: HOME | End: 2017-11-10
Payer: COMMERCIAL

## 2017-11-10 PROCEDURE — 97140 MANUAL THERAPY 1/> REGIONS: CPT

## 2017-11-10 PROCEDURE — 97110 THERAPEUTIC EXERCISES: CPT

## 2017-11-10 PROCEDURE — 97016 VASOPNEUMATIC DEVICE THERAPY: CPT

## 2017-11-10 NOTE — PROGRESS NOTES
PT DAILY TREATMENT NOTE     Patient Name: Ray Ho  Date:11/10/2017  : 1960  [x]  Patient  Verified  Payor: BLUE CROSS / Plan: [facility] PPO / Product Type: PPO /    In time: 10:05 Out time: 11:05  Total Treatment Time (min): 60  Visit #: 3 of     Treatment Area: S/P rotator cuff repair [Z98.890]  Right shoulder pain [M25.511]    SUBJECTIVE  Pain Level (0-10 scale): 1/10  Any medication changes, allergies to medications, adverse drug reactions, diagnosis change, or new procedure performed?: [x] No    [] Yes (see summary sheet for update)  Subjective functional status/changes:   No change    OBJECTIVE    Modality rationale: decrease edema and decrease inflammation to reduce irritability improve the patients functional capacity following her therapy session   Min Type Additional Details    [] Estim:  []Unatt       []IFC  []Premod                        []Other:  []w/ice   []w/heat  Position:  Location:    [] Estim: []Att    []TENS instruct  []NMES                    []Other:  []w/US   []w/ice   []w/heat  Position:  Location:    []  Traction: [] Cervical       []Lumbar                       [] Prone          []Supine                       []Intermittent   []Continuous Lbs:  [] before manual  [] after manual    []  Ultrasound: []Continuous   [] Pulsed                           []1MHz   []3MHz W/cm2:  Location:    []  Iontophoresis with dexamethasone         Location: [] Take home patch   [] In clinic    []  Ice     []  heat  []  Ice massage  []  Laser   []  Anodyne Position:  Location:    []  Laser with stim  []  Other:  Position:  Location:   10 + 5 min set up [x]  Vasopneumatic Device Pressure:       [] lo [x] med [] hi   Temperature: [x] lo [] med [] hi   [x] Skin assessment post-treatment:  [x]intact []redness- no adverse reaction    []redness  adverse reaction:     25 min Therapeutic Exercise:  [x] See flow sheet :   Rationale: increase ROM and increase strength to improve the patients ability to independently move and stabilize her shoulder as tolerated per MD protocol. 20 min min Manual Therapy:  R Shoulder PROM in supine: External/Internal rotation, Flexion PROM R Scapula mobilization in supine: medial/superior/inferior translations and upward rotations   Rationale: decrease pain, increase ROM and increase tissue extensibility of R shoulder to promote functional mobility. With   [] TE   [] TA   [] neuro   [] other: Patient Education: [x] Review HEP    [] Progressed/Changed HEP based on:   [] positioning   [] body mechanics   [] transfers   [] heat/ice application    [] other:      Other Objective/Functional Measures:     Pain Level (0-10 scale) post treatment: 0/10    ASSESSMENT/Changes in Function:   Pt continues to present as lacking full PROM in flexion/IR/ER due to pain and apprehension, however pt responded well to reintroduction of table slides for flexion and ER. Pt tolerates therex well. Continue POC per protocol. Patient will continue to benefit from skilled PT services to modify and progress therapeutic interventions, address functional mobility deficits, address ROM deficits, address strength deficits, analyze and address soft tissue restrictions, analyze and cue movement patterns and assess and modify postural abnormalities to attain remaining goals.     Progress towards goals / Updated goals:  New Goals to be achieved in __4__  weeks:  1.  Pt will be able to progress to active assisted/AROM activities up to 90 degrees pending MD clearance    2.  Patient will report > 50% functional improvement with washing, dressing ADL's   3.  Patient will improve AROM to WNL compared to L UE to improve performance of ADLs       PLAN  [x]  Upgrade activities as tolerated     [x]  Continue plan of care  []  Update interventions per flow sheet       []  Discharge due to:_  [x]  Other: Assess goals    Rubin Angel, PT, DPT  11/10/2017      No future appointments.

## 2017-11-16 ENCOUNTER — HOSPITAL ENCOUNTER (OUTPATIENT)
Dept: PHYSICAL THERAPY | Age: 57
Discharge: HOME | End: 2017-11-16
Payer: COMMERCIAL

## 2017-11-16 PROCEDURE — 97140 MANUAL THERAPY 1/> REGIONS: CPT

## 2017-11-16 PROCEDURE — 97110 THERAPEUTIC EXERCISES: CPT

## 2017-11-16 PROCEDURE — 97016 VASOPNEUMATIC DEVICE THERAPY: CPT

## 2017-11-16 NOTE — PROGRESS NOTES
PT DAILY TREATMENT NOTE     Patient Name: Roni Ho  Date:2017  : 1960  [x]  Patient  Verified  Payor: BLUE CROSS / Plan: [facility]UE [facility] PPO / Product Type: PPO /    In time: 11:30  Out time: 12:35  Total Treatment Time (min): 65  Visit #: 4 of     Treatment Area: S/P rotator cuff repair [Z98.890]  Right shoulder pain [M25.511]    SUBJECTIVE  Pain Level (0-10 scale): 0  Any medication changes, allergies to medications, adverse drug reactions, diagnosis change, or new procedure performed?: [x] No    [] Yes (see summary sheet for update)  Subjective functional status/changes:   [x] No changes reported    My shoulder feels stiff since I haven't had therapy in 6 days.     OBJECTIVE    Modality rationale: decrease edema and decrease inflammation to reduce irritability and improve the patients functional capacity following her therapy babitaison   Min Type Additional Details    [] Estim:  []Unatt       []IFC  []Premod                        []Other:  []w/ice   []w/heat  Position:  Location:    [] Estim: []Att    []TENS instruct  []NMES                    []Other:  []w/US   []w/ice   []w/heat  Position:  Location:    []  Traction: [] Cervical       []Lumbar                       [] Prone          []Supine                       []Intermittent   []Continuous Lbs:  [] before manual  [] after manual    []  Ultrasound: []Continuous   [] Pulsed                           []1MHz   []3MHz W/cm2:  Location:    []  Iontophoresis with dexamethasone         Location: [] Take home patch   [] In clinic    []  Ice     []  heat  []  Ice massage  []  Laser   []  Anodyne Position:  Location:    []  Laser with stim  []  Other:  Position:  Location:   15 [x]  Vasopneumatic Device Pressure:       [] lo [x] med [] hi   Temperature: [x] lo [] med [] hi   [x] Skin assessment post-treatment:  [x]intact []redness- no adverse reaction    []redness  adverse reaction:     35 min Therapeutic Exercise:  [x] See flow sheet :    Rationale: increase ROM and increase strength to improve the patients ability to independently move and stabilize her shoulder as tolerated per MD protocol     15 min Manual Therapy:  R Shoulder PROM in supine: External/Internal rotation, Flexion    Rationale: decrease pain, increase ROM and increase tissue extensibility of R shoulder to promote functional mobility          With   [] TE   [] TA   [] neuro   [] other: Patient Education: [x] Review HEP    [] Progressed/Changed HEP based on:   [] positioning   [] body mechanics   [] transfers   [] heat/ice application    [] other:      Other Objective/Functional Measures:     Patient able to perform supine AAROM shoulder flexion above 90 without compensations     Pain Level (0-10 scale) post treatment: 0/10    ASSESSMENT/Changes in Function:     Ms. Kary Colon was able to achieve her AAROM shoulder flexion goal today. She lacks full shoulder flexion/IR/ER PROM secondary to pain and apprehension, however ROM was not objectively measured this session. She will continue to benefit from PROM exercises, AAROM, and exercise progression per MD protocol to regain functional mobility and use of her R UE without an exacerbation of symptoms. Patient will continue to benefit from skilled PT services to modify and progress therapeutic interventions, address functional mobility deficits, address ROM deficits, address strength deficits, analyze and address soft tissue restrictions, analyze and cue movement patterns and assess and modify postural abnormalities to attain remaining goals.      Progress towards goals / Updated goals:  New Goals to be achieved in __4__  weeks:  1.  Pt will be able to progress to active assisted/AROM activities up to 90 degrees pending MD clearance Met, 11/16/17   2.  Patient will report > 50% functional improvement with washing, dressing ADL's   3.  Patient will improve AROM to WNL compared to L UE to improve performance of ADLs PLAN  [x]  Upgrade activities as tolerated     [x]  Continue plan of care  []  Update interventions per flow sheet       []  Discharge due to:_  [x]  Other: Measure shoulder ROM and assess ROM goal    Cornelia Jamil 11/16/2017  11:46 AM    Future Appointments  Date Time Provider Nancy Cantu   11/21/2017 11:00 AM Fernando Kat PTA [facility]   11/27/2017 9:30 AM Fernando Kat PTA [facility]   11/29/2017 9:30 AM Fernando Kat PTA [facility]

## 2017-11-20 ENCOUNTER — HOSPITAL ENCOUNTER (OUTPATIENT)
Dept: PHYSICAL THERAPY | Age: 57
Discharge: HOME | End: 2017-11-20
Payer: COMMERCIAL

## 2017-11-20 PROCEDURE — 97140 MANUAL THERAPY 1/> REGIONS: CPT

## 2017-11-20 PROCEDURE — 97110 THERAPEUTIC EXERCISES: CPT

## 2017-11-20 PROCEDURE — 97016 VASOPNEUMATIC DEVICE THERAPY: CPT

## 2017-11-20 NOTE — PROGRESS NOTES
PT DAILY TREATMENT NOTE     Patient Name: Az Ho  Date:2017  : 1960  [x]  Patient  Verified  Payor: BLUE CROSS / Plan: [facility] PPO / Product Type: PPO /    In time: 4:30  Out time: 5:38  Total Treatment Time (min):68  Visit #: 5 of     Treatment Area: S/P rotator cuff repair [Z98.890]  Right shoulder pain [M25.511]    SUBJECTIVE  Pain Level (0-10 scale): 0  Any medication changes, allergies to medications, adverse drug reactions, diagnosis change, or new procedure performed?: [x] No    [] Yes (see summary sheet for update)  Subjective functional status/changes:   [x] No changes reported    I am doing pretty good, I have been working a lot at home trying to get ready for Thanksgiving    OBJECTIVE    Modality rationale: decrease edema and decrease inflammation to reduce irritability and improve the patients functional capacity following her therapy sesison   Min Type Additional Details    [] Estim:  []Unatt       []IFC  []Premod                        []Other:  []w/ice   []w/heat  Position:  Location:    [] Estim: []Att    []TENS instruct  []NMES                    []Other:  []w/US   []w/ice   []w/heat  Position:  Location:    []  Traction: [] Cervical       []Lumbar                       [] Prone          []Supine                       []Intermittent   []Continuous Lbs:  [] before manual  [] after manual    []  Ultrasound: []Continuous   [] Pulsed                           []1MHz   []3MHz W/cm2:  Location:    []  Iontophoresis with dexamethasone         Location: [] Take home patch   [] In clinic    []  Ice     []  heat  []  Ice massage  []  Laser   []  Anodyne Position:  Location:    []  Laser with stim  []  Other:  Position:  Location:   15 [x]  Vasopneumatic Device Pressure:       [] lo [x] med [] hi   Temperature: [x] lo [] med [] hi   [x] Skin assessment post-treatment:  [x]intact []redness- no adverse reaction    []redness  adverse reaction:     38/30 min Therapeutic Exercise:  [x] See flow sheet : 8 min NC for warm up    Rationale: increase ROM and increase strength to improve the patients ability to independently move and stabilize her shoulder as tolerated per MD protocol     15 min Manual Therapy:  R Shoulder PROM in supine: External/Internal rotation, scap release to medial scap border in side lying  In supine pectoralis release and sub scap release    Rationale: decrease pain, increase ROM and increase tissue extensibility of R shoulder to promote functional mobility          With   [] TE   [] TA   [] neuro   [] other: Patient Education: [x] Review HEP    [] Progressed/Changed HEP based on:   [] positioning   [] body mechanics   [] transfers   [] heat/ice application    [] other:      Other Objective/Functional Measures:   Presents with tightness and tissue restriction to the pectoralis minor/major and to the subscapularis area       Pain Level (0-10 scale) post treatment: 0/10    ASSESSMENT/Changes in Function:     Patient will continue to benefit from skilled PT services to modify and progress therapeutic interventions, address functional mobility deficits, address ROM deficits, address strength deficits, analyze and address soft tissue restrictions, analyze and cue movement patterns and assess and modify postural abnormalities to attain remaining goals.      Progress towards goals / Updated goals:  New Goals to be achieved in __4__  weeks:  1.  Pt will be able to progress to active assisted/AROM activities up to 90 degrees pending MD clearance Met, 11/16/17   2.  Patient will report > 50% functional improvement with washing, dressing ADL's   3.  Patient will improve AROM to WNL compared to L UE to improve performance of ADLs     PLAN  [x]  Upgrade activities as tolerated     [x]  Continue plan of care  []  Update interventions per flow sheet       []  Discharge due to:_  []  Other:     Dana Romero PTA 11/20/2017  11:46 AM    Future Appointments  Date Time Provider Nancy Cantu   11/21/2017 11:00 AM Allyssa Rumps, PTA [facility]   11/27/2017 9:30 AM Allyssa Rumps, PTA [facility]   11/29/2017 9:30 AM Allyssa Rumps, PTA [facility]   12/4/2017 7:30 AM Allyssa Rumps, PTA [facility]   12/6/2017 7:30 AM Allyssa Rumps, PTA [facility]   12/8/2017 8:00 AM Allyssa Rumps, PTA [facility]   12/11/2017 7:30 AM Allyssa Rumps, PTA [facility]   12/13/2017 7:30 AM Allyssa Rumps, PTA [facility]   12/15/2017 7:30 AM Allyssa Rumps, PTA [facility]   12/18/2017 7:30 AM Allyssa Rumps, PTA [facility]   12/20/2017 7:30 AM Allyssa Rumps, PTA [facility]   12/22/2017 7:30 AM Allyssa Rumps, PTA [facility]   12/26/2017 7:30 AM Allyssa Rumps, PTA [facility]   12/27/2017 7:30 AM Allyssa Rumps, PTA [facility]   12/29/2017 7:30 AM Allyssa Rumps, PTA [facility]

## 2017-11-21 ENCOUNTER — HOSPITAL ENCOUNTER (OUTPATIENT)
Dept: PHYSICAL THERAPY | Age: 57
Discharge: HOME | End: 2017-11-21
Payer: COMMERCIAL

## 2017-11-21 PROCEDURE — 97140 MANUAL THERAPY 1/> REGIONS: CPT

## 2017-11-21 PROCEDURE — 97110 THERAPEUTIC EXERCISES: CPT

## 2017-11-21 PROCEDURE — 97016 VASOPNEUMATIC DEVICE THERAPY: CPT

## 2017-11-21 NOTE — PROGRESS NOTES
PT DAILY TREATMENT NOTE     Patient Name: Brandi Ho  Date:2017  : 1960  [x]  Patient  Verified  Payor: BLUE CROSS / Plan: [facility] PPO / Product Type: PPO /    In time: 11:00  Out time: 12:12  Total Treatment Time (min):72  Visit #: 6 of     Treatment Area: S/P rotator cuff repair [Z98.890]  Right shoulder pain [M25.511]    SUBJECTIVE  Pain Level (0-10 scale): 0/10  Any medication changes, allergies to medications, adverse drug reactions, diagnosis change, or new procedure performed?: [x] No    [] Yes (see summary sheet for update)  Subjective functional status/changes:   [x] No changes reported    It feels better today after getting it worked out yesterday - but I am still waking up in the middle of night with some discomfort     OBJECTIVE    Modality rationale: decrease edema and decrease inflammation to reduce irritability and improve the patients functional capacity following her therapy sesison   Min Type Additional Details    [] Estim:  []Unatt       []IFC  []Premod                        []Other:  []w/ice   []w/heat  Position:  Location:    [] Estim: []Att    []TENS instruct  []NMES                    []Other:  []w/US   []w/ice   []w/heat  Position:  Location:    []  Traction: [] Cervical       []Lumbar                       [] Prone          []Supine                       []Intermittent   []Continuous Lbs:  [] before manual  [] after manual    []  Ultrasound: []Continuous   [] Pulsed                           []1MHz   []3MHz W/cm2:  Location:    []  Iontophoresis with dexamethasone         Location: [] Take home patch   [] In clinic    []  Ice     []  heat  []  Ice massage  []  Laser   []  Anodyne Position:  Location:    []  Laser with stim  []  Other:  Position:  Location:   15 [x]  Vasopneumatic Device Pressure:       [] lo [x] med [] hi   Temperature: [x] lo [] med [] hi   [x] Skin assessment post-treatment:  [x]intact []redness- no adverse reaction []redness  adverse reaction:     37/30 min Therapeutic Exercise:  [x] See flow sheet : 7 min NC for warm up    Rationale: increase ROM and increase strength to improve the patients ability to independently move and stabilize her shoulder as tolerated per MD protocol     20 min Manual Therapy:  R Shoulder PROM in supine: External/Internal rotation, scap release to medial scap border in side lying  In supine pectoralis release and sub scap release    Rationale: decrease pain, increase ROM and increase tissue extensibility of R shoulder to promote functional mobility          With   [] TE   [] TA   [] neuro   [] other: Patient Education: [x] Review HEP    [] Progressed/Changed HEP based on:   [] positioning   [] body mechanics   [] transfers   [] heat/ice application    [] other:      Other Objective/Functional Measures:   Improved ability to achieve IR with adduction today with patient able to touch the table behind her and will less reports of pain during movement  Continue with current POC        Pain Level (0-10 scale) post treatment: 0/10    ASSESSMENT/Changes in Function:     Patient will continue to benefit from skilled PT services to modify and progress therapeutic interventions, address functional mobility deficits, address ROM deficits, address strength deficits, analyze and address soft tissue restrictions, analyze and cue movement patterns and assess and modify postural abnormalities to attain remaining goals.      Progress towards goals / Updated goals:  New Goals to be achieved in __4__  weeks:  1.  Pt will be able to progress to active assisted/AROM activities up to 90 degrees pending MD clearance Met, 11/16/17   2.  Patient will report > 50% functional improvement with washing, dressing ADL's   3.  Patient will improve AROM to WNL compared to L UE to improve performance of ADLs     PLAN  [x]  Upgrade activities as tolerated     [x]  Continue plan of care  []  Update interventions per flow sheet []  Discharge due to:_  []  Other:     Rowena Simpler, PTA 11/21/2017  11:46 AM    Future Appointments  Date Time Provider Nancy Rosadoi   11/27/2017 9:30 AM Orwena Simpler, PTA [facility]   11/29/2017 9:30 AM Rowena Simpler, PTA [facility]   12/4/2017 7:30 AM Rowena Simpler, PTA [facility]   12/6/2017 7:30 AM Rowena Simpler, PTA [facility]   12/8/2017 8:00 AM Rowena Simpler, PTA [facility]   12/11/2017 7:30 AM Rowena Simpler, PTA [facility]   12/13/2017 7:30 AM Rowena Simpler, PTA [facility]   12/15/2017 7:30 AM Rowena Simpler, PTA [facility]   12/18/2017 7:30 AM Rowena Simpler, PTA [facility]   12/20/2017 7:30 AM Rowena Simpler, PTA [facility]   12/22/2017 7:30 AM Rowena Simpler, PTA [facility]   12/26/2017 7:30 AM Rowena Simpler, PTA [facility]   12/27/2017 7:30 AM Rowena Simpler, PTA [facility]   12/29/2017 7:30 AM Rowena Simpler, PTA [facility]

## 2017-11-27 ENCOUNTER — HOSPITAL ENCOUNTER (OUTPATIENT)
Dept: PHYSICAL THERAPY | Age: 57
Discharge: HOME | End: 2017-11-27
Payer: COMMERCIAL

## 2017-11-27 PROCEDURE — 97110 THERAPEUTIC EXERCISES: CPT

## 2017-11-27 PROCEDURE — 97140 MANUAL THERAPY 1/> REGIONS: CPT

## 2017-11-27 NOTE — PROGRESS NOTES
PT DAILY TREATMENT NOTE     Patient Name: Brittany Ho  Date:2017  : 1960  [x]  Patient  Verified  Payor: BLUE CROSS / Plan: [facility] PPO / Product Type: PPO /    In time: 9:35   Out time: 10:45  Total Treatment Time (min):70  Visit #: 7 of     Treatment Area: S/P rotator cuff repair [Z98.890]  Right shoulder pain [M25.511]    SUBJECTIVE  Pain Level (0-10 scale): 0/10  Any medication changes, allergies to medications, adverse drug reactions, diagnosis change, or new procedure performed?: [x] No    [] Yes (see summary sheet for update)  Subjective functional status/changes:   [x] No changes reported     I have such a sore spot to the front of the shoulder as well as to that same spot in the shoulder blade     OBJECTIVE    Modality rationale: decrease edema and decrease inflammation to reduce irritability and improve the patients functional capacity following her therapy sesison   Min Type Additional Details    [] Estim:  []Unatt       []IFC  []Premod                        []Other:  []w/ice   []w/heat  Position:  Location:    [] Estim: []Att    []TENS instruct  []NMES                    []Other:  []w/US   []w/ice   []w/heat  Position:  Location:    []  Traction: [] Cervical       []Lumbar                       [] Prone          []Supine                       []Intermittent   []Continuous Lbs:  [] before manual  [] after manual    []  Ultrasound: []Continuous   [] Pulsed                           []1MHz   []3MHz W/cm2:  Location:    []  Iontophoresis with dexamethasone         Location: [] Take home patch   [] In clinic   10 []  Ice     [x]  heat  []  Ice massage  []  Laser   []  Anodyne Position: in sitting  Location: to shoulder    []  Laser with stim  []  Other:  Position:  Location:    []  Vasopneumatic Device Pressure:       [] lo [x] med [] hi   Temperature: [x] lo [] med [] hi   [x] Skin assessment post-treatment:  [x]intact []redness- no adverse reaction []redness  adverse reaction:     35/30 min Therapeutic Exercise:  [x] See flow sheet : 5 min NC for warm up    Rationale: increase ROM and increase strength to improve the patients ability to independently move and stabilize her shoulder as tolerated per MD protocol     25 min Manual Therapy:  R Shoulder PROM in supine: External/Internal rotation, scap release to medial scap border in side lying  In supine pectoralis release and sub scap release   Performed passive bicep stretch in standing    Rationale: decrease pain, increase ROM and increase tissue extensibility of R shoulder to promote functional mobility          With   [] TE   [] TA   [] neuro   [] other: Patient Education: [x] Review HEP    [] Progressed/Changed HEP based on:   [] positioning   [] body mechanics   [] transfers   [] heat/ice application    [] other:      Other Objective/Functional Measures: performed passive stretch to bicep and TPR to the LS/UT and lateral and medial border of scap to increase rotation          Pain Level (0-10 scale) post treatment: 0/10    ASSESSMENT/Changes in Function:     Patient will continue to benefit from skilled PT services to modify and progress therapeutic interventions, address functional mobility deficits, address ROM deficits, address strength deficits, analyze and address soft tissue restrictions, analyze and cue movement patterns and assess and modify postural abnormalities to attain remaining goals.      Progress towards goals / Updated goals:  New Goals to be achieved in __4__  weeks:  1.  Pt will be able to progress to active assisted/AROM activities up to 90 degrees pending MD clearance Met, 11/16/17   2.  Patient will report > 50% functional improvement with washing, dressing ADL's   3.  Patient will improve AROM to WNL compared to L UE to improve performance of ADLs     PLAN  [x]  Upgrade activities as tolerated     [x]  Continue plan of care  []  Update interventions per flow sheet       []  Discharge due to:_  []  Other:     Bonita Ocasio PTA 11/27/2017  11:46 AM    Future Appointments  Date Time Provider Nancy Alondra   11/29/2017 9:30 AM Bonita Ocasio PTA [facility]   12/4/2017 8:30 AM Bonita Ocasio PTA [facility]   12/6/2017 8:30 AM Bonita Ocasio PTA [facility]   12/8/2017 8:00 AM Bonita Ocasio PTA [facility]   12/11/2017 8:30 AM Bonita Ocasio PTA [facility]   12/13/2017 8:30 AM Bonita Ocasio PTA [facility]   12/15/2017 8:30 AM Bonita Ocasio PTA [facility]   12/18/2017 8:30 AM Bonita Ocasio PTA [facility]   12/20/2017 8:30 AM Bonita Ocasio PTA [facility]   12/22/2017 8:30 AM Bonita Ocasio PTA [facility]   12/26/2017 8:30 AM Bonita Ocasio PTA [facility]   12/27/2017 8:30 AM Bonita Ocasio PTA [facility]   12/29/2017 8:30 AM Bonita Ocasio, PTA [facility]

## 2017-11-29 ENCOUNTER — HOSPITAL ENCOUNTER (OUTPATIENT)
Dept: PHYSICAL THERAPY | Age: 57
Discharge: HOME | End: 2017-11-29
Payer: COMMERCIAL

## 2017-11-29 PROCEDURE — 97140 MANUAL THERAPY 1/> REGIONS: CPT

## 2017-11-29 PROCEDURE — 97110 THERAPEUTIC EXERCISES: CPT

## 2017-11-29 PROCEDURE — 97014 ELECTRIC STIMULATION THERAPY: CPT

## 2017-11-29 NOTE — PROGRESS NOTES
PT DAILY TREATMENT NOTE     Patient Name: Dimple Ho  Date:2017  : 1960  [x]  Patient  Verified  Payor: Tanya Archer / Plan: [id number] PPO / Product Type: PPO /    In time:  9:35 Out time: 10:55  Total Treatment Time (min):80  Visit #: 8 of     Treatment Area: S/P rotator cuff repair [Z98.890]  Right shoulder pain [M25.511]    SUBJECTIVE  Pain Level (0-10 scale): 0/10  Any medication changes, allergies to medications, adverse drug reactions, diagnosis change, or new procedure performed?: [x] No    [] Yes (see summary sheet for update)  Subjective functional status/changes:   [x] No changes reported  I think I was trying to lift more around the house - because it hurts to the front of my shoulder and that one area to the back of my shoulder blade    OBJECTIVE    Modality rationale: decrease edema and decrease inflammation to reduce irritability and improve the patients functional capacity following her therapy sesison   Min Type Additional Details   15+5 set up [x] Estim:  [x]Unatt       []IFC  [x]Premod                        []Other:  []w/ice   [x]w/heat  Position:sitting  Location: (R) shoulder - anterior and posterior deltoid    [] Estim: []Att    []TENS instruct  []NMES                    []Other:  []w/US   []w/ice   []w/heat  Position:  Location:    []  Traction: [] Cervical       []Lumbar                       [] Prone          []Supine                       []Intermittent   []Continuous Lbs:  [] before manual  [] after manual    []  Ultrasound: []Continuous   [] Pulsed                           []1MHz   []3MHz W/cm2:  Location:    []  Iontophoresis with dexamethasone         Location: [] Take home patch   [] In clinic    []  Ice     [x]  heat  []  Ice massage  []  Laser   []  Anodyne Position: in sitting  Location: to shoulder    []  Laser with stim  []  Other:  Position:  Location:    []  Vasopneumatic Device Pressure:       [] lo [x] med [] hi   Temperature: [x] lo [] med [] hi   [x] Skin assessment post-treatment:  [x]intact []redness- no adverse reaction    []redness  adverse reaction:     35/30 min Therapeutic Exercise:  [x] See flow sheet : 5 min NC for warm up    Rationale: increase ROM and increase strength to improve the patients ability to independently move and stabilize her shoulder as tolerated per MD protocol     25 min Manual Therapy:  R Shoulder PROM in supine: External/Internal rotation, scap release to medial scap border in side lying  In supine pectoralis release and sub scap release   Performed passive bicep stretch in standing    Rationale: decrease pain, increase ROM and increase tissue extensibility of R shoulder to promote functional mobility          With   [] TE   [] TA   [] neuro   [] other: Patient Education: [x] Review HEP    [] Progressed/Changed HEP based on:   [] positioning   [] body mechanics   [] transfers   [] heat/ice application    [] other:      Other Objective/Functional Measures: patient presents with scar tissue over scope site - performed scar massage with GT3 graston tool to assist with  Decreasing sensitivity over the areas. Continue with POC to increase ROM and decrease tightness and soft tissue restriction. Added premod e-stim to anterior and posterior aspect of deltoid after manual to assist with decreasing pain and muscle soreness          Pain Level (0-10 scale) post treatment: 0/10    ASSESSMENT/Changes in Function:     Patient will continue to benefit from skilled PT services to modify and progress therapeutic interventions, address functional mobility deficits, address ROM deficits, address strength deficits, analyze and address soft tissue restrictions, analyze and cue movement patterns and assess and modify postural abnormalities to attain remaining goals.      Progress towards goals / Updated goals:  New Goals to be achieved in __4__  weeks:  1.  Pt will be able to progress to active assisted/AROM activities up to 90 degrees pending MD clearance Met, 11/16/17   2.  Patient will report > 50% functional improvement with washing, dressing ADL's   3.  Patient will improve AROM to WNL compared to L UE to improve performance of ADLs     PLAN  [x]  Upgrade activities as tolerated     [x]  Continue plan of care  []  Update interventions per flow sheet       []  Discharge due to:_  []  Other:     Julia Meldimitry, PTA 11/29/2017  11:46 AM    Future Appointments  Date Time Provider Nancy Cantu   12/4/2017 8:30 AM Julia Meline, PTA [facility]   12/6/2017 8:30 AM Julia Meline, PTA [facility]   12/8/2017 8:00 AM Julia Meline, PTA [facility]   12/11/2017 8:30 AM Julia Meline, PTA [facility]   12/13/2017 8:30 AM Julia Meline, PTA [facility]   12/15/2017 8:30 AM Julia Meline, PTA [facility]   12/18/2017 8:30 AM Julia Meline, PTA [facility]   12/20/2017 8:30 AM Julia Meline, PTA [facility]   12/22/2017 8:30 AM Julia Meline, PTA [facility]   12/26/2017 8:30 AM Julia Meline, PTA [facility]   12/27/2017 8:30 AM Julia Meline, PTA [facility]   12/29/2017 8:30 AM Julia Meline, PTA [facility]

## 2017-12-04 ENCOUNTER — HOSPITAL ENCOUNTER (OUTPATIENT)
Dept: PHYSICAL THERAPY | Age: 57
Discharge: HOME | End: 2017-12-04
Payer: COMMERCIAL

## 2017-12-04 PROCEDURE — 97016 VASOPNEUMATIC DEVICE THERAPY: CPT

## 2017-12-04 PROCEDURE — 97140 MANUAL THERAPY 1/> REGIONS: CPT

## 2017-12-04 PROCEDURE — 97110 THERAPEUTIC EXERCISES: CPT

## 2017-12-04 NOTE — PROGRESS NOTES
[address] [facility] AT [facility]  [address] [place] [address],[address], [place], [place]  Phone [phone]  Fax [phone]  PROGRESS NOTE  Patient Name: Sushil Ho : 1960   Treatment/Medical Diagnosis: S/P rotator cuff repair [Z98.890]  Right shoulder pain [M25.511]   Referral Source: Belgica Ochoa MD     Date of Initial Visit: 17 Attended Visits: 26 Missed Visits: 0     SUMMARY OF TREATMENT  Patient has received physical therapy for (R) RCR, SAD - DOS 17 since 17. Treatment has included therapeutic stretching, strengthen, activities, manual/massage and modalities as need to assist with decreasing pain and increasing function. CURRENT STATUS  Patient has completed 26 visits  Patient reports overall 60% improvement since beginning therapy  FOTO (Functional Status Summary)  score is 60/100 was 16/100 initial evaluation - indication of overall functional improvement. In sidelying AROM with flexion 125 and abduction 100   PROM in sitting 141 with flexion and scaption 130   IR with adduction top of sacrum    ER (passive)  at 90/90 is 70 degrees   IR (passive) at 90/90 is 40 degrees   Patient's remaining chief c/o \"is getting the same motion in the arm as my other shoulder\"    Progress towards goals / Updated goals:  New Goals to be achieved in __4__  weeks:  1.  Pt will be able to progress to active assisted/AROM activities up to 90 degrees pending MD clearance Met, 17   2.  Patient will report > 50% functional improvement with washing, dressing ADL's MET 17   3.  Patient will improve AROM to WNL compared to L UE to improve performance of ADLs PROGRESSING TOWARDS 17          New Goals to be achieved in 4 weeks:  Long-term Goals: 4 weeks  1. Patient will improve AROM to WNL compared to L UE to improve performance of ADLs      Progress Note (17): In sidelying AROM with flexion 125 and abduction 100      Current:     2.  Patient will be able to perform IR with adduction to assist other hand with donning and doffing the bra      Progress Note (12/4/17): to the top of the sacrum      Current:     3. Pt will have R shoulder strength 4+/5 to improve activity tolerance. Progress Note (12/4/17): have not initiated  strength phase yet -       Current:     RECOMMENDATIONS  Continue with above POC for 2 to 3 times a week for 4 weeks to achieve above goals    If you have any questions/comments please contact us directly at [phone]. Thank you for allowing us to assist in the care of your patient. Therapist Signature: Padma Knapp PTA Date: 12/4/2017     Time: 9:43 AM   NOTE TO PHYSICIAN:  PLEASE COMPLETE THE ORDERS BELOW AND FAX TO   [facility] [place]: [phone]. If you are unable to process this request in 24 hours please contact our office: [phone].    ___ I have read the above report and request that my patient continue as recommended.   ___ I have read the above report and request that my patient continue therapy with the following changes/special instructions:_________________________________________________________   ___ I have read the above report and request that my patient be discharged from therapy.      Physician Signature:        Date:       Time:

## 2017-12-06 ENCOUNTER — HOSPITAL ENCOUNTER (OUTPATIENT)
Dept: PHYSICAL THERAPY | Age: 57
Discharge: HOME | End: 2017-12-06
Payer: COMMERCIAL

## 2017-12-06 PROCEDURE — 97110 THERAPEUTIC EXERCISES: CPT

## 2017-12-06 PROCEDURE — 97140 MANUAL THERAPY 1/> REGIONS: CPT

## 2017-12-06 PROCEDURE — 97016 VASOPNEUMATIC DEVICE THERAPY: CPT

## 2017-12-06 NOTE — PROGRESS NOTES
PT DAILY TREATMENT NOTE     Patient Name: Sherren Abide Gleason  Date:2017  : 1960  [x]  Patient  Verified  Payor: [employer] [place] / Plan: Chip Morales [id number] PPO / Product Type: PPO /    In time:8:34  Out time: 9:44  Total Treatment Time (min):70  Visit #: 1 of     Treatment Area: S/P rotator cuff repair [Z98.890]  Right shoulder pain [M25.511]    SUBJECTIVE  Pain Level (0-10 scale): 4/10  Any medication changes, allergies to medications, adverse drug reactions, diagnosis change, or new procedure performed?: [x] No    [] Yes (see summary sheet for update)  Subjective functional status/changes:   [x] No changes reported  I fell yesterday and landed on my arms out stretched because the cat ran in front of me and tripped me up     OBJECTIVE    Modality rationale: decrease edema and decrease inflammation to reduce irritability and improve the patients functional capacity following her therapy mei Hall Type Additional Details    [] Estim:  [x]Unatt       []IFC  [x]Premod                        []Other:  []w/ice   [x]w/heat  Position:sitting  Location: (R) shoulder - anterior and posterior deltoid    [] Estim: []Att    []TENS instruct  []NMES                    []Other:  []w/US   []w/ice   []w/heat  Position:  Location:    []  Traction: [] Cervical       []Lumbar                       [] Prone          []Supine                       []Intermittent   []Continuous Lbs:  [] before manual  [] after manual    []  Ultrasound: []Continuous   [] Pulsed                           []1MHz   []3MHz W/cm2:  Location:    []  Iontophoresis with dexamethasone         Location: [] Take home patch   [] In clinic    []  Ice     [x]  heat  []  Ice massage  []  Laser   []  Anodyne Position: in sitting  Location: to shoulder    []  Laser with stim  []  Other:  Position:  Location:   15 [x]  Vasopneumatic Device Pressure:       [] lo [x] med [] hi   Temperature: [x] lo [] med [] hi   [x] Skin assessment post-treatment: [x]intact []redness- no adverse reaction    []redness  adverse reaction:     25/20 min Therapeutic Exercise:  [x] See flow sheet : 5 min NC for warm up    Rationale: increase ROM and increase strength to improve the patients ability to independently move and stabilize her shoulder as tolerated per MD protocol     30 min Manual Therapy:  R Shoulder PROM in supine: External/Internal rotation, scap release to medial scap border in side lying  In supine pectoralis release and sub scap release   Performed passive bicep stretch in standing    Rationale: decrease pain, increase ROM and increase tissue extensibility of R shoulder to promote functional mobility          With   [] TE   [] TA   [] neuro   [] other: Patient Education: [x] Review HEP    [] Progressed/Changed HEP based on:   [] positioning   [] body mechanics   [] transfers   [] heat/ice application    [] other:      Other Objective/Functional Measures: increase time spent with increase ER and IR to improve active elevation above head and performed scar tissue massage to posterior scope site     Pain Level (0-10 scale) post treatment: 0/10    ASSESSMENT/Changes in Function:     Patient will continue to benefit from skilled PT services to modify and progress therapeutic interventions, address functional mobility deficits, address ROM deficits, address strength deficits, analyze and address soft tissue restrictions, analyze and cue movement patterns and assess and modify postural abnormalities to attain remaining goals. Progress towards goals / Updated goals: 12/4/17  New Goals to be achieved in 4 weeks:  Long-term Goals: 4 weeks  1. Patient will improve AROM to WNL compared to L UE to improve performance of ADLs      Progress Note (12/4/17):  In sidelying AROM with flexion 125 and abduction 100      Current:      2.  Patient will be able to perform IR with adduction to assist other hand with donning and doffing the bra      Progress Note (12/4/17): to the top of the sacrum      Current:      3. Pt will have R shoulder strength 4+/5 to improve activity tolerance.      Progress Note (12/4/17): have not initiated  strength phase yet -       Current:     PLAN  [x]  Upgrade activities as tolerated     [x]  Continue plan of care  []  Update interventions per flow sheet       []  Discharge due to:_  []  Other:  Kalpana Aaron PTA 12/6/2017  11:46 AM    Future Appointments  Date Time Provider Nancy Cantu   12/8/2017 8:00 AM Kalpana Aaron PTA [facility]   12/11/2017 8:30 AM Kalpana Aaron PTA [facility]   12/13/2017 8:30 AM Kalpana Aaron PTA [facility]   12/15/2017 8:30 AM Kalpana Aaron PTA [facility]   12/18/2017 8:30 AM Kalpana Aaron PTA [facility]   12/20/2017 8:30 AM Kalpana Aaron PTA [facility]   12/22/2017 8:30 AM Kalpana Aaron PTA [facility]   12/26/2017 8:30 AM Kalpana Aaron PTA [facility]   12/27/2017 8:30 AM Kalpana Aaron PTA [facility]   12/29/2017 8:30 AM Kalpana Aaron PTA [facility]

## 2017-12-08 ENCOUNTER — HOSPITAL ENCOUNTER (OUTPATIENT)
Dept: PHYSICAL THERAPY | Age: 57
Discharge: HOME | End: 2017-12-08
Payer: COMMERCIAL

## 2017-12-08 ENCOUNTER — APPOINTMENT (OUTPATIENT)
Dept: PHYSICAL THERAPY | Age: 57
End: 2017-12-08
Payer: COMMERCIAL

## 2017-12-08 PROCEDURE — 97110 THERAPEUTIC EXERCISES: CPT

## 2017-12-08 PROCEDURE — 97140 MANUAL THERAPY 1/> REGIONS: CPT

## 2017-12-08 PROCEDURE — 97016 VASOPNEUMATIC DEVICE THERAPY: CPT

## 2017-12-08 NOTE — PROGRESS NOTES
PT DAILY TREATMENT NOTE     Patient Name: Jane Ho  Date:2017  : 1960  [x]  Patient  Verified  Payor: [employer] / Plan: Chip Morales [id number] PPO / Product Type: PPO /    In time:200  Out time:310  Total Treatment Time (min): 70    Visit #: 2 of 8    Treatment Area: S/P rotator cuff repair [Z98.890]  Right shoulder pain [M25.511]    SUBJECTIVE  Pain Level (0-10 scale): 0  Any medication changes, allergies to medications, adverse drug reactions, diagnosis change, or new procedure performed?: [x] No    [] Yes (see summary sheet for update)  Subjective functional status/changes:   [] No changes reported  Im just feeling stiff , no real pain     OBJECTIVE  Modality rationale: decrease pain and increase tissue extensibility to improve the patients ability to perform ADLs. Min Type Additional Details    [] Estim: []Att   []Unatt        []TENS instruct                  []IFC  []Premod   []NMES                     []Other:  []w/US   []w/ice   []w/heat  Position:  Location:    []  Traction: [] Cervical       []Lumbar                       [] Prone          []Supine                       []Intermittent   []Continuous Lbs:  [] before manual  [] after manual    []  Ultrasound: []Continuous   [] Pulsed                           []1MHz   []3MHz Location:  W/cm2:    []  Iontophoresis with dexamethasone         Location: [] Take home patch   [] In clinic    []  Ice     []  heat  []  Ice massage Position:  Location:   15 [x]  Vasopneumatic Device Pressure:       [] lo [x] med [] hi   Temperature: [x] lo [] med [] hi   [x] Skin assessment post-treatment:  [x]intact []redness- no adverse reaction       []redness  adverse reaction:       25 min Therapeutic Exercise:  [x] See flow sheet :   Rationale: increase ROM to improve the patients ability to perform ADLS. 30 min Manual Therapy:    In supine pectoralis release and sub scap release STM/DTM along subscapularis, Upper traps, deltiod, supraspinatus. Right shoulder PROM and AAROM in supine:  Flex, scaption, IR/ER   Rationale: decrease pain, increase ROM, increase tissue extensibility and decrease trigger points to allow return to eventual PLOF            5 w/ TE min Patient Education: [x] Review HEP    [] Progressed/Changed HEP based on:   [] positioning   [] body mechanics   [] transfers   [] heat/ice application        Other Objective/Functional Measures:  AAROM Right shoulder 0-160* flexion     Pain Level (0-10 scale) post treatment: 0    ASSESSMENT/Changes in Function: Patient tolerated manual and exercises well today. PAtient is 10 week post op. Patient returns to doctor on 12/12 /17 . Patient continues to present with trigger points along subscapularis. Patients AAROM shoulder flexion in supine position is 0-160*. Patient will continue to benefit from skilled PT services to modify and progress therapeutic interventions, address functional mobility deficits, address ROM deficits, address strength deficits and analyze and address soft tissue restrictions to attain remaining goals. Progress towards goals / Updated goals:  Long-term Goals: 4 weeks  1. Patient will improve AROM to WNL compared to L UE to improve performance of ADLs. Progressing 12/8/17 AAROM Flexion 0-160*      2. Patient will be able to perform IR with adduction to assist other hand with donning and doffing the brace.       3. Pt will have R shoulder strength 4+/5 to improve activity tolerance.  Continued goal as strengthening protocol has not been initiated yet.      PLAN     [x]  Continue plan of care      Anna Daniels, PT 12/8/2017  2:58 PM

## 2017-12-11 ENCOUNTER — HOSPITAL ENCOUNTER (OUTPATIENT)
Dept: PHYSICAL THERAPY | Age: 57
Discharge: HOME | End: 2017-12-11
Payer: COMMERCIAL

## 2017-12-11 PROCEDURE — 97016 VASOPNEUMATIC DEVICE THERAPY: CPT

## 2017-12-11 PROCEDURE — 97110 THERAPEUTIC EXERCISES: CPT

## 2017-12-11 PROCEDURE — 97140 MANUAL THERAPY 1/> REGIONS: CPT

## 2017-12-11 NOTE — PROGRESS NOTES
PT DAILY TREATMENT NOTE     Patient Name: Rebecca Ho  Date:2017  : 1960  [x]  Patient  Verified  Payor: Rena Sophy / Plan: Chip Morales [id number] PPO / Product Type: PPO /    In time:8:30  Out time: 9:45  Total Treatment Time (min):75  Visit #: 3 of     Treatment Area: S/P rotator cuff repair [Z98.890]  Right shoulder pain [M25.511]    SUBJECTIVE  Pain Level (0-10 scale): 1/10 with pain medication   Any medication changes, allergies to medications, adverse drug reactions, diagnosis change, or new procedure performed?: [x] No    [] Yes (see summary sheet for update)  Subjective functional status/changes:   [x] No changes reported  I am feeling better now but again I woke up this morning early in pain and I had to take some pain medication     OBJECTIVE    Modality rationale: decrease edema and decrease inflammation to reduce irritability and improve the patients functional capacity following her therapy sesison   Min Type Additional Details    [] Estim:  [x]Unatt       []IFC  [x]Premod                        []Other:  []w/ice   [x]w/heat  Position:sitting  Location: (R) shoulder - anterior and posterior deltoid    [] Estim: []Att    []TENS instruct  []NMES                    []Other:  []w/US   []w/ice   []w/heat  Position:  Location:    []  Traction: [] Cervical       []Lumbar                       [] Prone          []Supine                       []Intermittent   []Continuous Lbs:  [] before manual  [] after manual    []  Ultrasound: []Continuous   [] Pulsed                           []1MHz   []3MHz W/cm2:  Location:    []  Iontophoresis with dexamethasone         Location: [] Take home patch   [] In clinic   15+5 set up  []  Ice     [x]  heat  []  Ice massage  []  Laser   []  Anodyne Position: in sitting  Location: to shoulder    []  Laser with stim  []  Other:  Position:  Location:    []  Vasopneumatic Device Pressure:       [] lo [x] med [] hi   Temperature: [x] lo [] med [] hi [x] Skin assessment post-treatment:  [x]intact []redness- no adverse reaction    []redness  adverse reaction:     25/20 min Therapeutic Exercise:  [x] See flow sheet : 5 min NC for warm up    Rationale: increase ROM and increase strength to improve the patients ability to independently move and stabilize her shoulder as tolerated per MD protocol     30 min Manual Therapy:  R Shoulder PROM in supine: External/Internal rotation, scap release to medial scap border in side lying  In supine pectoralis release and sub scap release   Performed passive bicep stretch in standing    Rationale: decrease pain, increase ROM and increase tissue extensibility of R shoulder to promote functional mobility          With   [] TE   [] TA   [] neuro   [] other: Patient Education: [x] Review HEP    [] Progressed/Changed HEP based on:   [] positioning   [] body mechanics   [] transfers   [] heat/ice application    [] other:      Other Objective/Functional Measures: continue with increasing ROM - will progress to t-band from isometrics next visit to progress with increasing strength and overall function to the shoulder      Pain Level (0-10 scale) post treatment: 0/10    ASSESSMENT/Changes in Function:     Patient will continue to benefit from skilled PT services to modify and progress therapeutic interventions, address functional mobility deficits, address ROM deficits, address strength deficits, analyze and address soft tissue restrictions, analyze and cue movement patterns and assess and modify postural abnormalities to attain remaining goals. Progress towards goals / Updated goals: 12/4/17  New Goals to be achieved in 4 weeks:  Long-term Goals: 4 weeks  1. Patient will improve AROM to WNL compared to L UE to improve performance of ADLs      Progress Note (12/4/17):  In sidelying AROM with flexion 125 and abduction 100      Current:      2.  Patient will be able to perform IR with adduction to assist other hand with donning and doffing the bra      Progress Note (12/4/17): to the top of the sacrum      Current:      3. Pt will have R shoulder strength 4+/5 to improve activity tolerance.      Progress Note (12/4/17): have not initiated  strength phase yet -       Current:     PLAN  [x]  Upgrade activities as tolerated     [x]  Continue plan of care  []  Update interventions per flow sheet       []  Discharge due to:_  []  Other:  Padma Knapp PTA 12/11/2017  11:46 AM    Future Appointments  Date Time Provider Nancy Cantu   12/13/2017 8:30 AM Padma Knapp, PTA [facility]   12/15/2017 8:30 AM Padma Knapp, PTA [facility]   12/18/2017 8:30 AM Padma Knapp, PTA [facility]   12/20/2017 8:30 AM Padma Knapp PTA [facility]   12/22/2017 8:30 AM Padma Knapp, PTA [facility]   12/26/2017 8:30 AM Padma Knapp, PTA [facility]   12/27/2017 8:30 AM Padma Knapp, PTA [facility]   12/29/2017 8:30 AM Padma Knapp PTA [facility]

## 2017-12-13 ENCOUNTER — HOSPITAL ENCOUNTER (OUTPATIENT)
Dept: PHYSICAL THERAPY | Age: 57
Discharge: HOME | End: 2017-12-13
Payer: COMMERCIAL

## 2017-12-13 PROCEDURE — 97140 MANUAL THERAPY 1/> REGIONS: CPT

## 2017-12-13 PROCEDURE — 97110 THERAPEUTIC EXERCISES: CPT

## 2017-12-13 NOTE — PROGRESS NOTES
PT DAILY TREATMENT NOTE     Patient Name: Christopher Ho  Date:2017  : 1960  [x]  Patient  Verified  Payor: Rizwan Hernandez / Plan: Chip Morales [id number] PPO / Product Type: PPO /    In time: 8:30 Out time: 9:42  Total Treatment Time (min):72  Visit #: 4 of     Treatment Area: S/P rotator cuff repair [Z98.890]  Right shoulder pain [M25.511]    SUBJECTIVE  Pain Level (0-10 scale): 0/10    Any medication changes, allergies to medications, adverse drug reactions, diagnosis change, or new procedure performed?: [x] No    [] Yes (see summary sheet for update)  Subjective functional status/changes:   [x] No changes reported   I had to call the doctor's office to get a new script to get some additional pain medication to help with sleeping especially     OBJECTIVE    Modality rationale: decrease edema and decrease inflammation to reduce irritability and improve the patients functional capacity following her therapy mei Hall Type Additional Details    [] Estim:  [x]Unatt       []IFC  [x]Premod                        []Other:  []w/ice   [x]w/heat  Position:sitting  Location: (R) shoulder - anterior and posterior deltoid    [] Estim: []Att    []TENS instruct  []NMES                    []Other:  []w/US   []w/ice   []w/heat  Position:  Location:    []  Traction: [] Cervical       []Lumbar                       [] Prone          []Supine                       []Intermittent   []Continuous Lbs:  [] before manual  [] after manual    []  Ultrasound: []Continuous   [] Pulsed                           []1MHz   []3MHz W/cm2:  Location:    []  Iontophoresis with dexamethasone         Location: [] Take home patch   [] In clinic   15+5 set up  []  Ice     [x]  heat  []  Ice massage  []  Laser   []  Anodyne Position: in sitting  Location: to shoulder    []  Laser with stim  []  Other:  Position:  Location:    []  Vasopneumatic Device Pressure:       [] lo [x] med [] hi   Temperature: [x] lo [] med [] hi   [x] Skin assessment post-treatment:  [x]intact []redness- no adverse reaction    []redness  adverse reaction:     32/27 min Therapeutic Exercise:  [x] See flow sheet : 5 min NC for warm up   Changed isometrics to t-band  Added supine serratus punches and in side lying active shoulder flexion and abduction    Rationale: increase ROM and increase strength to improve the patients ability to independently move and stabilize her shoulder as tolerated per MD protocol     20 min Manual Therapy:  R Shoulder PROM in supine: External/Internal rotation, scap release to medial scap border in side lying  In supine pectoralis release and sub scap release   Performed passive bicep stretch in standing    Rationale: decrease pain, increase ROM and increase tissue extensibility of R shoulder to promote functional mobility          With   [] TE   [] TA   [] neuro   [] other: Patient Education: [x] Review HEP    [] Progressed/Changed HEP based on:   [] positioning   [] body mechanics   [] transfers   [] heat/ice application    [] other:      Other Objective/Functional Measures: progressed patient with changing isometrics to t-band - patient was challenged with shoulder abduction and flexion - reported some feeling of \"burning\" and fatigue after 2 reps - however the burning sensation did decrease after taking a break to continue with performing the exercise - patient stated that she can feel how weak and how quickly the shoulder does fatigue with activities  Initiated LTG#3 today     Pain Level (0-10 scale) post treatment: 0/10    ASSESSMENT/Changes in Function:     Patient will continue to benefit from skilled PT services to modify and progress therapeutic interventions, address functional mobility deficits, address ROM deficits, address strength deficits, analyze and address soft tissue restrictions, analyze and cue movement patterns and assess and modify postural abnormalities to attain remaining goals.      Progress towards goals / Updated goals: 12/4/17  New Goals to be achieved in 4 weeks:  Long-term Goals: 4 weeks  1. Patient will improve AROM to WNL compared to L UE to improve performance of ADLs      Progress Note (12/4/17):  In sidelying AROM with flexion 125 and abduction 100      Current:      2. Patient will be able to perform IR with adduction to assist other hand with donning and doffing the bra      Progress Note (12/4/17): to the top of the sacrum      Current:      3. Pt will have R shoulder strength 4+/5 to improve activity tolerance.      Progress Note (12/4/17): have not initiated  strength phase yet -       Current: initiated today 12/13/17    PLAN  [x]  Upgrade activities as tolerated     [x]  Continue plan of care  []  Update interventions per flow sheet       []  Discharge due to:_  []  Other:  Konstantin Nicholas, CLAU 12/13/2017  11:46 AM    Future Appointments  Date Time Provider Nancy Cantu   12/15/2017 8:30 AM Freeda Noon, PTA [facility]   12/18/2017 8:30 AM Freeda Noon, PTA [facility]   12/20/2017 8:30 AM Freeda Noon, PTA [facility]   12/22/2017 8:30 AM Freeda Noon, PTA [facility]   12/26/2017 8:30 AM Freeda Noon, PTA [facility]   12/27/2017 8:30 AM Freeda Noon, PTA [facility]   12/29/2017 8:30 AM Freeda Noon, PTA [facility]

## 2017-12-15 ENCOUNTER — HOSPITAL ENCOUNTER (OUTPATIENT)
Dept: PHYSICAL THERAPY | Age: 57
Discharge: HOME | End: 2017-12-15
Payer: COMMERCIAL

## 2017-12-15 PROCEDURE — 97140 MANUAL THERAPY 1/> REGIONS: CPT

## 2017-12-15 PROCEDURE — 97110 THERAPEUTIC EXERCISES: CPT

## 2017-12-15 NOTE — PROGRESS NOTES
PT DAILY TREATMENT NOTE     Patient Name: Christopher Ho  Date:12/15/2017  : 1960  [x]  Patient  Verified  Payor: Rizwan Hernandez / Plan: Chip Morales [id number] PPO / Product Type: PPO /    In time: 8:30 Out time: 9:45  Total Treatment Time (min):75  Visit #: 5 of     Treatment Area: S/P rotator cuff repair [Z98.890]  Right shoulder pain [M25.511]    SUBJECTIVE  Pain Level (0-10 scale): 0/10    Any medication changes, allergies to medications, adverse drug reactions, diagnosis change, or new procedure performed?: [x] No    [] Yes (see summary sheet for update)  Subjective functional status/changes:   [x] No changes reported  I am doing better now that I am back on the pain medication to help with the pain that I started having again     OBJECTIVE    Modality rationale: decrease edema and decrease inflammation to reduce irritability and improve the patients functional capacity following her therapy mei Hall Type Additional Details    [] Estim:  [x]Unatt       []IFC  [x]Premod                        []Other:  []w/ice   [x]w/heat  Position:sitting  Location: (R) shoulder - anterior and posterior deltoid    [] Estim: []Att    []TENS instruct  []NMES                    []Other:  []w/US   []w/ice   []w/heat  Position:  Location:    []  Traction: [] Cervical       []Lumbar                       [] Prone          []Supine                       []Intermittent   []Continuous Lbs:  [] before manual  [] after manual    []  Ultrasound: []Continuous   [] Pulsed                           []1MHz   []3MHz W/cm2:  Location:    []  Iontophoresis with dexamethasone         Location: [] Take home patch   [] In clinic   15+5 set up  []  Ice     [x]  heat  []  Ice massage  []  Laser   []  Anodyne Position: in sitting  Location: to shoulder    []  Laser with stim  []  Other:  Position:  Location:    []  Vasopneumatic Device Pressure:       [] lo [x] med [] hi   Temperature: [x] lo [] med [] hi   [x] Skin assessment post-treatment:  [x]intact []redness- no adverse reaction    []redness  adverse reaction:     30/25 min Therapeutic Exercise:  [x] See flow sheet : 5 min NC for warm up       Rationale: increase ROM and increase strength to improve the patients ability to independently move and stabilize her shoulder as tolerated per MD protocol     25 min Manual Therapy:  R Shoulder PROM in supine: External/Internal rotation, scap release to medial scap border in side lying  In supine pectoralis release and sub scap release     Rationale: decrease pain, increase ROM and increase tissue extensibility of R shoulder to promote functional mobility          With   [] TE   [] TA   [] neuro   [] other: Patient Education: [x] Review HEP    [] Progressed/Changed HEP based on:   [] positioning   [] body mechanics   [] transfers   [] heat/ice application    [] other:      Other Objective/Functional Measures: continue with increasing strength to assist with decreasing pain and discomfort that patient is feeling that could be due to muscle atrophy   Pain Level (0-10 scale) post treatment: 0/10    ASSESSMENT/Changes in Function:     Patient will continue to benefit from skilled PT services to modify and progress therapeutic interventions, address functional mobility deficits, address ROM deficits, address strength deficits, analyze and address soft tissue restrictions, analyze and cue movement patterns and assess and modify postural abnormalities to attain remaining goals. Progress towards goals / Updated goals: 12/4/17  New Goals to be achieved in 4 weeks:  Long-term Goals: 4 weeks  1. Patient will improve AROM to WNL compared to L UE to improve performance of ADLs      Progress Note (12/4/17):  In sidelying AROM with flexion 125 and abduction 100      Current:      2.  Patient will be able to perform IR with adduction to assist other hand with donning and doffing the bra      Progress Note (12/4/17): to the top of the sacrum Current:      3. Pt will have R shoulder strength 4+/5 to improve activity tolerance.      Progress Note (12/4/17): have not initiated  strength phase yet -       Current: initiated today 12/13/17    PLAN  [x]  Upgrade activities as tolerated     [x]  Continue plan of care  []  Update interventions per flow sheet       []  Discharge due to:_  []  Other:  Mile Barnard [facility] 12/15/2017  11:46 AM    Future Appointments  Date Time Provider Nancy Cantu   12/18/2017 8:30 AM Mile Barnard [address]   12/20/2017 8:30 AM Mile Barnard, [address]   12/22/2017 8:30 AM Mile Barnard, [place] [address] [address]   12/26/2017 8:30 AM Mile Barnard, [address]   12/27/2017 8:30 AM Mile Barnard, [address] [place]   12/29/2017 8:30 AM Mile Barnadr, [address]

## 2017-12-18 ENCOUNTER — HOSPITAL ENCOUNTER (OUTPATIENT)
Dept: PHYSICAL THERAPY | Age: 57
Discharge: HOME | End: 2017-12-18
Payer: COMMERCIAL

## 2017-12-18 PROCEDURE — 97140 MANUAL THERAPY 1/> REGIONS: CPT

## 2017-12-18 PROCEDURE — 97014 ELECTRIC STIMULATION THERAPY: CPT

## 2017-12-18 PROCEDURE — 97110 THERAPEUTIC EXERCISES: CPT

## 2017-12-18 NOTE — PROGRESS NOTES
PT DAILY TREATMENT NOTE     Patient Name: Jane Ho  Date:2017  : 1960  [x]  Patient  Verified  Payor: BLUE [place] / Plan: Chip Morales [id number] PPO / Product Type: PPO /    In time: 8:30 Out time: 9:40  Total Treatment Time (min):70  Visit #: 6 of     Treatment Area: S/P rotator cuff repair [Z98.890]  Right shoulder pain [M25.511]    SUBJECTIVE  Pain Level (0-10 scale): 0/10    Any medication changes, allergies to medications, adverse drug reactions, diagnosis change, or new procedure performed?: [x] No    [] Yes (see summary sheet for update)  Subjective functional status/changes:   [x] No changes reported  I don't know if it is from the fall I had over a week ago - but I still have pain to the side of my arm with certain movements- I see the doctor tomorrow     OBJECTIVE    Modality rationale: decrease edema and decrease inflammation to reduce irritability and improve the patients functional capacity following her therapy sesison   Min Type Additional Details    [] Estim:  [x]Unatt       []IFC  [x]Premod                        []Other:  []w/ice   [x]w/heat  Position:sitting  Location: (R) shoulder - anterior and posterior deltoid   15+5 [x] Estim: []Att    []TENS instruct  [x]NMES                    []Other:  []w/US   []w/ice   []w/heat  Position: in sitting  Location: teres minor/major/infraspinatus and deltoid     []  Traction: [] Cervical       []Lumbar                       [] Prone          []Supine                       []Intermittent   []Continuous Lbs:  [] before manual  [] after manual    []  Ultrasound: []Continuous   [] Pulsed                           []1MHz   []3MHz W/cm2:  Location:    []  Iontophoresis with dexamethasone         Location: [] Take home patch   [] In clinic     []  Ice     []  heat  []  Ice massage  []  Laser   []  Anodyne Position: in sitting  Location: to shoulder    []  Laser with stim  []  Other:  Position:  Location:    []  Vasopneumatic Device Pressure:       [] lo [x] med [] hi   Temperature: [x] lo [] med [] hi   [x] Skin assessment post-treatment:  [x]intact []redness- no adverse reaction    []redness  adverse reaction:     30/25 min Therapeutic Exercise:  [x] See flow sheet : 5 min NC for warm up       Rationale: increase ROM and increase strength to improve the patients ability to independently move and stabilize her shoulder as tolerated per MD protocol     20 min Manual Therapy:  R Shoulder PROM in supine: External/Internal rotation, scap release to medial scap border in side lying  In supine pectoralis release and sub scap release     Rationale: decrease pain, increase ROM and increase tissue extensibility of R shoulder to promote functional mobility          With   [] TE   [] TA   [] neuro   [] other: Patient Education: [x] Review HEP    [] Progressed/Changed HEP based on:   [] positioning   [] body mechanics   [] transfers   [] heat/ice application    [] other:      Other Objective/Functional Measures:  115 flexion and 115 abduction to 120 with scaption         120 flexion after manual        125 abduction with slight subsitutation after manual        MMT with ER/IR 4+/5 with no pain        MMT with shoulder flexion and abduction at 90 degrees of both       motion is 4/5 with pain over deltoid         Added [place] to the [facility] and deltoid to assist with increasing       strength    Pain Level (0-10 scale) post treatment: 0/10    ASSESSMENT/Changes in Function:   Please refer to progress report   Patient will continue to benefit from skilled PT services to modify and progress therapeutic interventions, address functional mobility deficits, address ROM deficits, address strength deficits, analyze and address soft tissue restrictions, analyze and cue movement patterns and assess and modify postural abnormalities to attain remaining goals.      Progress towards goals / Updated goals: 12/4/17  New Goals to be achieved in 4 weeks:  Long-term Goals: 4 weeks  1. Patient will improve AROM to WNL compared to L UE to improve performance of ADLs      Progress Note (12/4/17):  In sidelying AROM with flexion 125 and abduction 100      Current:120 flexion after manual in sitting        125 abduction with slight subsitutation after manual in sitting    2. Patient will be able to perform IR with adduction to assist other hand with donning and doffing the bra      Progress Note (12/4/17): to the top of the sacrum      Current:      3. Pt will have R shoulder strength 4+/5 to improve activity tolerance.      Progress Note (12/4/17): have not initiated  strength phase yet -       Current: 4/5 MMT flexion/abduction - with pain over the deltoid 12/18/2017    PLAN  [x]  Upgrade activities as tolerated     [x]  Continue plan of care  []  Update interventions per flow sheet       []  Discharge due to:_  [x]  Other: please refer progress report  Rosalva Reece [facility] 12/18/2017  11:46 AM    Future Appointments  Date Time Provider Nancy Cantu   12/20/2017 8:30 AM Amedeo Reece, [facility]   12/22/2017 8:30 AM Amedeo Reece, PTA [facility]   12/26/2017 8:30 AM Amedeo Reece, PTA [facility]   12/27/2017 8:30 AM Amedeashtyn Reece, [facility]   12/29/2017 8:30 AM Amedeo Reece, PTA [facility]

## 2017-12-18 NOTE — PROGRESS NOTES
[address] [facility] AT [facility]  [address] [facility] [address] [place] [address],[address], Miguel ledesma, Nadine Davison  Phone [phone]  Fax [phone]  PROGRESS NOTE  Patient Name: Woodie Brunner Gleason : 1960   Treatment/Medical Diagnosis: S/P rotator cuff repair [Z98.890]  Right shoulder pain [M25.511]   Referral Source: Marquis Ernesto MD     Date of Initial Visit: 17 Attended Visits: 32 Missed Visits: 1     SUMMARY OF TREATMENT  Patient has received physical therapy for (R) RCR, SAD - DOS 17 since 17 . Treatment has included therapeutic stretching, strengthen, activities, manual/massage and modalities as need to assist with decreasing pain and increasing function. CURRENT STATUS  Patient has completed 32 visits  Patient's remaining chief c/o is on 17 patient fell with her (R) arm outstretched secondary to cat running out in front her. Patient reports pain over the deltoid area with active ROM of flexion/abduction  115 flexion and 115 abduction to 120 with scaption before stretching  120 flexion after manual and 125 abduction with slight subsitutation   MMT with ER/IR 4+/5 with no pain  MMT with shoulder flexion and abduction at 90 degrees of both motion is 4/5 with pain over deltoid   Added [place] to the [facility] and deltoid to assist with increasing strength    New Goals to be achieved in 4 weeks:  Long-term Goals: 4 weeks  1. Patient will improve AROM to WNL compared to L UE to improve performance of ADLs      Progress Note (17):  In sidelying AROM with flexion 125 and abduction 100      Current:120 flexion after manual in sitting                             125 abduction with slight subsitutation after manual in sitting     2. Patient will be able to perform IR with adduction to assist other hand with donning and doffing the bra      Progress Note (17): to the top of the sacrum      Current:      3.  Pt will have R shoulder strength 4+/5 to improve activity tolerance.     Progress Note (12/4/17): have not initiated  strength phase yet -       Current: 4/5 MMT flexion/abduction - with pain over the deltoid 12/18/2017      RECOMMENDATIONS  Continue with above POC for 2 to 3 times a week for 4 weeks to achieve above goals    If you have any questions/comments please contact us directly at [phone]. Thank you for allowing us to assist in the care of your patient. Therapist Signature: Flor Lopes PTA Date: 12/18/2017     Time: 10:46 AM   NOTE TO PHYSICIAN:  PLEASE COMPLETE THE ORDERS BELOW AND FAX TO   [facility]: [phone]. If you are unable to process this request in 24 hours please contact our office: [phone].    ___ I have read the above report and request that my patient continue as recommended.   ___ I have read the above report and request that my patient continue therapy with the following changes/special instructions:_________________________________________________________   ___ I have read the above report and request that my patient be discharged from therapy.      Physician Signature:        Date:       Time:

## 2017-12-20 ENCOUNTER — HOSPITAL ENCOUNTER (OUTPATIENT)
Dept: PHYSICAL THERAPY | Age: 57
Discharge: HOME | End: 2017-12-20
Payer: COMMERCIAL

## 2017-12-20 PROCEDURE — 97140 MANUAL THERAPY 1/> REGIONS: CPT

## 2017-12-20 PROCEDURE — 97110 THERAPEUTIC EXERCISES: CPT

## 2017-12-20 NOTE — PROGRESS NOTES
PT DAILY TREATMENT NOTE     Patient Name: Marina Ho  Date:2017  : 1960  [x]  Patient  Verified  Payor: BLUE [employer]OSS / Plan: [facility] PPO / Product Type: PPO /    In time: 8:34 Out time: 9:44  Total Treatment Time (min):70  Visit #: 1 of     Treatment Area: S/P rotator cuff repair [Z98.890]  Right shoulder pain [M25.511]    SUBJECTIVE  Pain Level (0-10 scale): 0/10    Any medication changes, allergies to medications, adverse drug reactions, diagnosis change, or new procedure performed?: [x] No    [] Yes (see summary sheet for update)  Subjective functional status/changes:   [x] No changes reported    I saw the PA yesterday and she said I had a frozen shoulder and gave me new anti-inflammatory and that has already started to help - but I guess I need to do new exercises/stretching to help.   OBJECTIVE    Modality rationale: decrease edema and decrease inflammation to reduce irritability and improve the patients functional capacity following her therapy sesison   Min Type Additional Details    [] Estim:  [x]Unatt       []IFC  [x]Premod                        []Other:  []w/ice   [x]w/heat  Position:sitting  Location: (R) shoulder - anterior and posterior deltoid    [] Estim: []Att    []TENS instruct  [x]NMES                    []Other:  []w/US   []w/ice   []w/heat  Position: in sitting  Location: teres minor/major/infraspinatus and deltoid     []  Traction: [] Cervical       []Lumbar                       [] Prone          []Supine                       []Intermittent   []Continuous Lbs:  [] before manual  [] after manual    []  Ultrasound: []Continuous   [] Pulsed                           []1MHz   []3MHz W/cm2:  Location:    []  Iontophoresis with dexamethasone         Location: [] Take home patch   [] In clinic   10  []  Ice     [x]  heat  []  Ice massage  []  Laser   []  Anodyne Position: in sitting  Location: to shoulder    []  Laser with stim  []  Other: Position:  Location:    []  Vasopneumatic Device Pressure:       [] lo [x] med [] hi   Temperature: [x] lo [] med [] hi   [x] Skin assessment post-treatment:  [x]intact []redness- no adverse reaction    []redness  adverse reaction:     30/25 min Therapeutic Exercise:  [x] See flow sheet : 5 min NC for warm up  Added ER stretch with doorway and IR with strap 5 times x 30 secs       Rationale: increase ROM and increase strength to improve the patients ability to independently move and stabilize her shoulder as tolerated per MD protocol     30 min Manual Therapy:  R Shoulder PROM in supine: External/Internal rotation, scap release to medial scap border in side lying  In supine pectoralis release and sub scap release    Added Graston tech to the manual to the pectoralis muscles, and around scap border with GT2,3,5 tools - brushing, fanning, framing, J-stroke   Rationale: decrease pain, increase ROM and increase tissue extensibility of R shoulder to promote functional mobility          With   [] TE   [] TA   [] neuro   [] other: Patient Education: [x] Review HEP    [] Progressed/Changed HEP based on:   [] positioning   [] body mechanics   [] transfers   [] heat/ice application    [] other:   [x] Graston Education: Explained the effects and benefits of Graston Technique therapy including potential for post treatment soreness and bruising.      Other Objective/Functional Measures:  Patient had f/u with MD and PA stated on progress report - adhesive capsulitis - stressed the importance of stretching - with new stretches several times a day to increase ROM and decrease symptoms - added Graston - see above - to aid with decreasing soft tissue restriction to improve ROM    Pain Level (0-10 scale) post treatment: 0/10    ASSESSMENT/Changes in Function:     Patient will continue to benefit from skilled PT services to modify and progress therapeutic interventions, address functional mobility deficits, address ROM deficits, address strength deficits, analyze and address soft tissue restrictions, analyze and cue movement patterns and assess and modify postural abnormalities to attain remaining goals. Progress report 12/18/17  New Goals to be achieved in 4 weeks:  Long-term Goals: 4 weeks  1. Patient will improve AROM to WNL compared to L UE to improve performance of ADLs      Progress Note (12/4/17):  In sidelying AROM with flexion 125 and abduction 100      Current:120 flexion after manual in sitting                             125 abduction with slight subsitutation after manual in sitting      2. Patient will be able to perform IR with adduction to assist other hand with donning and doffing the bra      Progress Note (12/4/17): to the top of the sacrum      Current:      3.  Pt will have R shoulder strength 4+/5 to improve activity tolerance.     Progress Note (12/4/17): have not initiated  strength phase yet -       Current: 4/5 MMT flexion/abduction - with pain over the deltoid 12/18/2017    PLAN  [x]  Upgrade activities as tolerated     [x]  Continue plan of care  []  Update interventions per flow sheet       []  Discharge due to:_  []  Other:   Elisabeth Corado PTA 12/20/2017  11:46 AM    Future Appointments  Date Time Provider Nancy Cantu   12/22/2017 8:30 AM Elisabeth Corado PTA [facility]   12/26/2017 8:30 AM Elisabeth Corado PTA [facility]   12/27/2017 8:30 AM Elisabeth Corado PTA [facility]   12/29/2017 8:30 AM Elisabeth Corado PTA [facility]

## 2017-12-22 ENCOUNTER — APPOINTMENT (OUTPATIENT)
Dept: PHYSICAL THERAPY | Age: 57
End: 2017-12-22
Payer: COMMERCIAL

## 2017-12-26 ENCOUNTER — HOSPITAL ENCOUNTER (OUTPATIENT)
Dept: PHYSICAL THERAPY | Age: 57
Discharge: HOME | End: 2017-12-26
Payer: COMMERCIAL

## 2017-12-26 PROCEDURE — 97014 ELECTRIC STIMULATION THERAPY: CPT

## 2017-12-26 PROCEDURE — 97140 MANUAL THERAPY 1/> REGIONS: CPT

## 2017-12-26 PROCEDURE — 97110 THERAPEUTIC EXERCISES: CPT

## 2017-12-26 NOTE — PROGRESS NOTES
PT DAILY TREATMENT NOTE     Patient Name: Sabrina Ho  Date:2017  : 1960  [x]  Patient  Verified  Payor: [employer] / Plan: Chip Morales [id number] PPO / Product Type: PPO /    In time: 8:35  Out time: 9:50  Total Treatment Time (min):75  Visit #: 2 of     Treatment Area: S/P rotator cuff repair [Z98.890]  Right shoulder pain [M25.511]    SUBJECTIVE  Pain Level (0-10 scale): 0/10    Any medication changes, allergies to medications, adverse drug reactions, diagnosis change, or new procedure performed?: [x] No    [] Yes (see summary sheet for update)  Subjective functional status/changes:   [x] No changes reported  I was sick over the weekend and I don't know if it from the anti-inflammatory so I stopped it and I am just taking Tylenol - I have been doing my stretches      OBJECTIVE    Modality rationale: decrease edema and decrease inflammation to reduce irritability and improve the patients functional capacity following her therapy sesison   Min Type Additional Details   15+5 [] Estim:  [x]Unatt       [x]IFC  []Premod                        []Other:  []w/ice   [x]w/heat  Position:sitting  Location: (R) shoulder - anterior and posterior deltoid    [] Estim: []Att    []TENS instruct  [x]NMES                    []Other:  []w/US   []w/ice   []w/heat  Position: in sitting  Location: teres minor/major/infraspinatus and deltoid     []  Traction: [] Cervical       []Lumbar                       [] Prone          []Supine                       []Intermittent   []Continuous Lbs:  [] before manual  [] after manual    []  Ultrasound: []Continuous   [] Pulsed                           []1MHz   []3MHz W/cm2:  Location:    []  Iontophoresis with dexamethasone         Location: [] Take home patch   [] In clinic    []  Ice     [x]  heat  []  Ice massage  []  Laser   []  Anodyne Position: in sitting  Location: to shoulder    []  Laser with stim  []  Other:  Position:  Location:    []  Vasopneumatic Device Pressure:       [] lo [x] med [] hi   Temperature: [x] lo [] med [] hi   [x] Skin assessment post-treatment:  [x]intact []redness- no adverse reaction    []redness  adverse reaction:     20/15 min Therapeutic Exercise:  [x] See flow sheet : 5 min NC for warm up  Added sitting on side of bed and body slides with (R) shoulder into flexion and abduction and in supine ER with abduction with clasping hands together onto forehead       Rationale: increase ROM and increase strength to improve the patients ability to independently move and stabilize her shoulder as tolerated per MD protocol     35 min Manual Therapy:  R Shoulder PROM in supine: External/Internal rotation, scap release to medial scap border in side lying  In supine pectoralis release and sub scap release   Grade II,III joint mobs to increase IR and ER to increase active elevation       Rationale: decrease pain, increase ROM and increase tissue extensibility of R shoulder to promote functional mobility          With   [] TE   [] TA   [] neuro   [] other: Patient Education: [x] Review HEP    [] Progressed/Changed HEP based on:   [] positioning   [] body mechanics   [] transfers   [] heat/ice application    [] other:   [x] Graston Education: Explained the effects and benefits of Graston Technique therapy including potential for post treatment soreness and bruising. Other Objective/Functional Measures:  Increase performance with manual with increase joint mobs to increase ER   Pain Level (0-10 scale) post treatment: 0/10    ASSESSMENT/Changes in Function:     Patient will continue to benefit from skilled PT services to modify and progress therapeutic interventions, address functional mobility deficits, address ROM deficits, address strength deficits, analyze and address soft tissue restrictions, analyze and cue movement patterns and assess and modify postural abnormalities to attain remaining goals.      Progress report 12/18/17  New Goals to be achieved in 4 weeks:  Long-term Goals: 4 weeks  1. Patient will improve AROM to WNL compared to L UE to improve performance of ADLs      Progress Note (12/4/17):  In sidelying AROM with flexion 125 and abduction 100      Current:120 flexion after manual in sitting                             125 abduction with slight subsitutation after manual in sitting      2. Patient will be able to perform IR with adduction to assist other hand with donning and doffing the bra      Progress Note (12/4/17): to the top of the sacrum      Current:      3.  Pt will have R shoulder strength 4+/5 to improve activity tolerance.     Progress Note (12/4/17): have not initiated  strength phase yet -       Current: 4/5 MMT flexion/abduction - with pain over the deltoid 12/18/2017    PLAN  [x]  Upgrade activities as tolerated     [x]  Continue plan of care  []  Update interventions per flow sheet       []  Discharge due to:_  []  Other:   Daphney Bryant PTA 12/26/2017  11:46 AM    Future Appointments  Date Time Provider Nancy Cantu   12/27/2017 8:30 AM Daphney Bryant PTA [place] [id number] [facility]   12/29/2017 8:30 AM Daphney Bryant PTA [place] [id number] [facility]

## 2017-12-27 ENCOUNTER — HOSPITAL ENCOUNTER (OUTPATIENT)
Dept: PHYSICAL THERAPY | Age: 57
Discharge: HOME | End: 2017-12-27
Payer: COMMERCIAL

## 2017-12-27 PROCEDURE — 97140 MANUAL THERAPY 1/> REGIONS: CPT

## 2017-12-27 PROCEDURE — 97110 THERAPEUTIC EXERCISES: CPT

## 2017-12-27 PROCEDURE — 97014 ELECTRIC STIMULATION THERAPY: CPT

## 2017-12-27 NOTE — PROGRESS NOTES
PT DAILY TREATMENT NOTE     Patient Name: Beronica Ho  Date:2017  : 1960  [x]  Patient  Verified  Payor: Iza Hernandez / Plan: Chip Morales [id number] PPO / Product Type: PPO /    In time: 8:30  Out time: 9:45  Total Treatment Time (min):75  Visit #: 3 of     Treatment Area: S/P rotator cuff repair [Z98.890]  Right shoulder pain [M25.511]    SUBJECTIVE  Pain Level (0-10 scale): 0/10    Any medication changes, allergies to medications, adverse drug reactions, diagnosis change, or new procedure performed?: [x] No    [] Yes (see summary sheet for update)  Subjective functional status/changes:   [x] No changes reported  I did have to put ice on my shoulder twice yesterday but it is okay - it needs to be done      OBJECTIVE    Modality rationale: decrease edema and decrease inflammation to reduce irritability and improve the patients functional capacity following her therapy sesison   Min Type Additional Details   15+5 [] Estim:  [x]Unatt       [x]IFC  []Premod                        []Other:  []w/ice   [x]w/heat  Position:sitting  Location: (R) shoulder - anterior and posterior deltoid    [] Estim: []Att    []TENS instruct  [x]NMES                    []Other:  []w/US   []w/ice   []w/heat  Position: in sitting  Location: teres minor/major/infraspinatus and deltoid     []  Traction: [] Cervical       []Lumbar                       [] Prone          []Supine                       []Intermittent   []Continuous Lbs:  [] before manual  [] after manual    []  Ultrasound: []Continuous   [] Pulsed                           []1MHz   []3MHz W/cm2:  Location:    []  Iontophoresis with dexamethasone         Location: [] Take home patch   [] In clinic    []  Ice     [x]  heat  []  Ice massage  []  Laser   []  Anodyne Position: in sitting  Location: to shoulder    []  Laser with stim  []  Other:  Position:  Location:    []  Vasopneumatic Device Pressure:       [] lo [x] med [] hi   Temperature: [x] lo [] med [] hi   [x] Skin assessment post-treatment:  [x]intact []redness- no adverse reaction    []redness  adverse reaction:     25/20 min Therapeutic Exercise:  [x] See flow sheet : 5 min NC for warm up     Rationale: increase ROM and increase strength to improve the patients ability to independently move and stabilize her shoulder as tolerated per MD protocol     30 min Manual Therapy:  R Shoulder PROM in supine: External/Internal rotation, scap release to medial scap border in side lying  In supine pectoralis release and sub scap release   Grade II,III joint mobs to increase IR and ER to increase active elevation    Graston tech to the manual to the pectoralis muscles, and around scap border with GT2,3,5 tools - brushing, fanning, framing, J-stroke     Rationale: decrease pain, increase ROM and increase tissue extensibility of R shoulder to promote functional mobility          With   [] TE   [] TA   [] neuro   [] other: Patient Education: [x] Review HEP    [] Progressed/Changed HEP based on:   [] positioning   [] body mechanics   [] transfers   [] heat/ice application    [] other:   [x] Graston Education: Explained the effects and benefits of Graston Technique therapy including potential for post treatment soreness and bruising. Other Objective/Functional Measures:  Continue with increasing ROM - patient presented with improved joint gliding with manual especially gliding posteriorly to allow improved passive ER and IR  Pain Level (0-10 scale) post treatment: 0/10    ASSESSMENT/Changes in Function:     Patient will continue to benefit from skilled PT services to modify and progress therapeutic interventions, address functional mobility deficits, address ROM deficits, address strength deficits, analyze and address soft tissue restrictions, analyze and cue movement patterns and assess and modify postural abnormalities to attain remaining goals.      Progress report 12/18/17  New Goals to be achieved in 4 weeks:  Long-term Goals: 4 weeks  1. Patient will improve AROM to WNL compared to L UE to improve performance of ADLs      Progress Note (12/4/17):  In sidelying AROM with flexion 125 and abduction 100      Current:120 flexion after manual in sitting                             125 abduction with slight subsitutation after manual in sitting      2. Patient will be able to perform IR with adduction to assist other hand with donning and doffing the bra      Progress Note (12/4/17): to the top of the sacrum      Current:      3.  Pt will have R shoulder strength 4+/5 to improve activity tolerance.     Progress Note (12/4/17): have not initiated  strength phase yet -       Current: 4/5 MMT flexion/abduction - with pain over the deltoid 12/18/2017    PLAN  [x]  Upgrade activities as tolerated     [x]  Continue plan of care  []  Update interventions per flow sheet       []  Discharge due to:_  []  Other:   Thelma Johnson PTA 12/27/2017  11:46 AM    Future Appointments  Date Time Provider Nancy Cantu   12/29/2017 8:30 AM Thelma Johnson PTA [facility]

## 2017-12-29 ENCOUNTER — HOSPITAL ENCOUNTER (OUTPATIENT)
Dept: PHYSICAL THERAPY | Age: 57
Discharge: HOME | End: 2017-12-29
Payer: COMMERCIAL

## 2017-12-29 PROCEDURE — 97014 ELECTRIC STIMULATION THERAPY: CPT

## 2017-12-29 PROCEDURE — 97110 THERAPEUTIC EXERCISES: CPT

## 2017-12-29 PROCEDURE — 97140 MANUAL THERAPY 1/> REGIONS: CPT

## 2017-12-29 NOTE — PROGRESS NOTES
PT DAILY TREATMENT NOTE     Patient Name: Olga Ho  Date:2017  : 1960  [x]  Patient  Verified  Payor: Genesis Herbert / Plan: Chip Morales [id number] PPO / Product Type: PPO /    In time: 8:35  Out time: 9:50  Total Treatment Time (min):75  Visit #: 4 of     Treatment Area: S/P rotator cuff repair [Z98.890]  Right shoulder pain [M25.511]    SUBJECTIVE  Pain Level (0-10 scale): 0/10    Any medication changes, allergies to medications, adverse drug reactions, diagnosis change, or new procedure performed?: [x] No    [] Yes (see summary sheet for update)  Subjective functional status/changes:   [x] No changes reported  I have been doing my exercises and stretching a lot       OBJECTIVE    Modality rationale: decrease edema and decrease inflammation to reduce irritability and improve the patients functional capacity following her therapy mei Hall Type Additional Details   15+5 [] Estim:  [x]Unatt       [x]IFC  []Premod                        []Other:  []w/ice   [x]w/heat  Position:sitting  Location: (R) shoulder - anterior and posterior deltoid    [] Estim: []Att    []TENS instruct  [x]NMES                    []Other:  []w/US   []w/ice   []w/heat  Position: in sitting  Location: teres minor/major/infraspinatus and deltoid     []  Traction: [] Cervical       []Lumbar                       [] Prone          []Supine                       []Intermittent   []Continuous Lbs:  [] before manual  [] after manual    []  Ultrasound: []Continuous   [] Pulsed                           []1MHz   []3MHz W/cm2:  Location:    []  Iontophoresis with dexamethasone         Location: [] Take home patch   [] In clinic    []  Ice     [x]  heat  []  Ice massage  []  Laser   []  Anodyne Position: in sitting  Location: to shoulder    []  Laser with stim  []  Other:  Position:  Location:    []  Vasopneumatic Device Pressure:       [] lo [x] med [] hi   Temperature: [x] lo [] med [] hi   [x] Skin assessment post-treatment:  [x]intact []redness- no adverse reaction    []redness  adverse reaction:     25/20 min Therapeutic Exercise:  [x] See flow sheet : 5 min NC for warm up     Rationale: increase ROM and increase strength to improve the patients ability to independently move and stabilize her shoulder as tolerated per MD protocol     30 min Manual Therapy:  R Shoulder PROM in supine: External/Internal rotation, scap release to medial scap border in side lying  In supine pectoralis release and sub scap release   Grade II,III joint mobs to increase IR and ER to increase active elevation    Graston tech to the manual to the pectoralis muscles, and around scap border with GT2,3,5 tools - brushing, fanning, framing, J-stroke     Rationale: decrease pain, increase ROM and increase tissue extensibility of R shoulder to promote functional mobility          With   [] TE   [] TA   [] neuro   [] other: Patient Education: [x] Review HEP    [] Progressed/Changed HEP based on:   [] positioning   [] body mechanics   [] transfers   [] heat/ice application    [] other:   [x] Graston Education: Explained the effects and benefits of Graston Technique therapy including potential for post treatment soreness and bruising. Other Objective/Functional Measures:  Improved joint glide noted with anterior and posterior motion - continue with increasing ROM with manual, joint mobs and graston techinque and performing daily HEP 5 times a day     Pain Level (0-10 scale) post treatment: 0/10    ASSESSMENT/Changes in Function:     Patient will continue to benefit from skilled PT services to modify and progress therapeutic interventions, address functional mobility deficits, address ROM deficits, address strength deficits, analyze and address soft tissue restrictions, analyze and cue movement patterns and assess and modify postural abnormalities to attain remaining goals.      Progress report 12/18/17  New Goals to be achieved in 4 weeks:  Long-term Goals: 4 weeks  1. Patient will improve AROM to WNL compared to L UE to improve performance of ADLs      Progress Note (12/4/17):  In sidelying AROM with flexion 125 and abduction 100      Current:120 flexion after manual in sitting                             125 abduction with slight subsitutation after manual in sitting      2. Patient will be able to perform IR with adduction to assist other hand with donning and doffing the bra      Progress Note (12/4/17): to the top of the sacrum      Current:      3.  Pt will have R shoulder strength 4+/5 to improve activity tolerance.     Progress Note (12/4/17): have not initiated  strength phase yet -       Current: 4/5 MMT flexion/abduction - with pain over the deltoid 12/18/2017    PLAN  [x]  Upgrade activities as tolerated     [x]  Continue plan of care  []  Update interventions per flow sheet       []  Discharge due to:_  []  Other:   Gloria Arango PTA 12/29/2017  11:46 AM    Future Appointments  Date Time Provider Nancy Cantu   1/2/2018 8:00 AM Gloria Arango, PTA [facility]   1/5/2018 8:30 AM Gloria Arango, PTA [facility]   1/9/2018 8:30 AM Gloria Arango, PTA [facility]   1/12/2018 8:30 AM Gloria Arango, PTA [facility]   1/16/2018 8:30 AM Gloria Redo, PTA [facility]   1/19/2018 8:30 AM Gloria Redo, PTA [facility]   1/23/2018 8:30 AM Gloria Arango, PTA [facility]   1/26/2018 8:30 AM Gloria Arango, PTA [facility]   1/30/2018 8:30 AM Gloriajuanjose Arango PTA [facility]

## 2018-01-02 ENCOUNTER — HOSPITAL ENCOUNTER (OUTPATIENT)
Dept: PHYSICAL THERAPY | Age: 58
Discharge: HOME | End: 2018-01-02
Payer: COMMERCIAL

## 2018-01-02 PROCEDURE — 97014 ELECTRIC STIMULATION THERAPY: CPT

## 2018-01-02 PROCEDURE — 97110 THERAPEUTIC EXERCISES: CPT

## 2018-01-02 PROCEDURE — 97140 MANUAL THERAPY 1/> REGIONS: CPT

## 2018-01-02 NOTE — PROGRESS NOTES
PT DAILY TREATMENT NOTE     Patient Name: Lasha Ho  Date:2018  : 1960  [x]  Patient  Verified  Payor: [employer]UE [employer] / Plan: Chip Morales [id number] PPO / Product Type: PPO /    In time: 8:08  Out time: 9:28  Total Treatment Time (min):80  Visit #: 5 of     Treatment Area: S/P rotator cuff repair [Z98.890]  Right shoulder pain [M25.511]    SUBJECTIVE  Pain Level (0-10 scale): 0/10    Any medication changes, allergies to medications, adverse drug reactions, diagnosis change, or new procedure performed?: [x] No    [] Yes (see summary sheet for update)  Subjective functional status/changes:   [x] No changes reported  I feel pretty good today - not any bruising after last time and it feels better after you do that Graston on my shoulder in a weird kind of way      OBJECTIVE    Modality rationale: decrease edema and decrease inflammation to reduce irritability and improve the patients functional capacity following her therapy sesison   Min Type Additional Details   15+5 [] Estim:  [x]Unatt       [x]IFC  []Premod                        []Other:  []w/ice   [x]w/heat  Position:sitting  Location: (R) shoulder - anterior and posterior deltoid    [] Estim: []Att    []TENS instruct  [x]NMES                    []Other:  []w/US   []w/ice   []w/heat  Position: in sitting  Location: teres minor/major/infraspinatus and deltoid     []  Traction: [] Cervical       []Lumbar                       [] Prone          []Supine                       []Intermittent   []Continuous Lbs:  [] before manual  [] after manual    []  Ultrasound: []Continuous   [] Pulsed                           []1MHz   []3MHz W/cm2:  Location:    []  Iontophoresis with dexamethasone         Location: [] Take home patch   [] In clinic    []  Ice     [x]  heat  []  Ice massage  []  Laser   []  Anodyne Position: in sitting  Location: to shoulder    []  Laser with stim  []  Other:  Position:  Location:    []  Vasopneumatic Device Pressure: [] lo [x] med [] hi   Temperature: [x] lo [] med [] hi   [x] Skin assessment post-treatment:  [x]intact []redness- no adverse reaction    []redness  adverse reaction:     30/25 min Therapeutic Exercise:  [x] See flow sheet : 5 min NC for warm up     Rationale: increase ROM and increase strength to improve the patients ability to independently move and stabilize her shoulder as tolerated per MD protocol     30 min Manual Therapy:  R Shoulder PROM in supine: External/Internal rotation, scap release to medial scap border in side lying  In supine pectoralis release and sub scap release   Grade II,III joint mobs to increase IR and ER to increase active elevation    Graston tech to the manual to the pectoralis muscles, and around scap border with GT2,3,5 tools - brushing, fanning, framing, J-stroke     Rationale: decrease pain, increase ROM and increase tissue extensibility of R shoulder to promote functional mobility          With   [] TE   [] TA   [] neuro   [] other: Patient Education: [x] Review HEP    [] Progressed/Changed HEP based on:   [] positioning   [] body mechanics   [] transfers   [] heat/ice application    [] other:   [x] Graston Education: Explained the effects and benefits of Graston Technique therapy including potential for post treatment soreness and bruising. Other Objective/Functional Measures:  Patient presents with improved IR/adduction and ER/abduction- able to get under the medial border of the scapula with decreased muscular tightness noted     Pain Level (0-10 scale) post treatment: 0/10    ASSESSMENT/Changes in Function:     Patient will continue to benefit from skilled PT services to modify and progress therapeutic interventions, address functional mobility deficits, address ROM deficits, address strength deficits, analyze and address soft tissue restrictions, analyze and cue movement patterns and assess and modify postural abnormalities to attain remaining goals.      Progress report 12/18/17  New Goals to be achieved in 4 weeks:  Long-term Goals: 4 weeks  1. Patient will improve AROM to WNL compared to L UE to improve performance of ADLs      Progress Note (12/4/17):  In sidelying AROM with flexion 125 and abduction 100      Current:120 flexion after manual in sitting                             125 abduction with slight subsitutation after manual in sitting      2. Patient will be able to perform IR with adduction to assist other hand with donning and doffing the bra      Progress Note (12/4/17): to the top of the sacrum      Current:      3.  Pt will have R shoulder strength 4+/5 to improve activity tolerance.     Progress Note (12/4/17): have not initiated  strength phase yet -       Current: 4/5 MMT flexion/abduction - with pain over the deltoid 12/18/2017    PLAN  [x]  Upgrade activities as tolerated     [x]  Continue plan of care  []  Update interventions per flow sheet       []  Discharge due to:_  []  Other:   Ailyn Grajeda PTA 1/2/2018  11:46 AM    Future Appointments  Date Time Provider Nancy Cantu   1/5/2018 8:30 AM Ailyn Grajeda PTA [facility]   1/9/2018 8:30 AM Ailyn Grajeda PTA [facility]   1/12/2018 8:30 AM Ailyn Grajeda PTA [facility]   1/16/2018 8:30 AM Ailyn Grajeda PTA [facility]   1/19/2018 8:30 AM Ailyn Grajeda PTA [facility]   1/23/2018 8:30 AM Ailyn Grajeda PTA [facility]   1/26/2018 8:30 AM Ailyn Grajeda PTA [facility]   1/30/2018 8:30 AM Ailyn Grajeda PTA [facility]

## 2018-01-05 ENCOUNTER — APPOINTMENT (OUTPATIENT)
Dept: PHYSICAL THERAPY | Age: 58
End: 2018-01-05
Payer: COMMERCIAL

## 2018-01-09 ENCOUNTER — HOSPITAL ENCOUNTER (OUTPATIENT)
Dept: PHYSICAL THERAPY | Age: 58
Discharge: HOME | End: 2018-01-09
Payer: COMMERCIAL

## 2018-01-09 PROCEDURE — 97140 MANUAL THERAPY 1/> REGIONS: CPT

## 2018-01-09 PROCEDURE — 97110 THERAPEUTIC EXERCISES: CPT

## 2018-01-09 NOTE — PROGRESS NOTES
PT DAILY TREATMENT NOTE     Patient Name: Matthieu Case  Date:2018  : 1960  [x]  Patient  Verified  Payor: BLUE CROSS / Plan: [facility] PPO / Product Type: PPO /    In time: 8:30  Out time: 9:45  Total Treatment Time (min):75  Visit #: 6 of     Treatment Area: S/P rotator cuff repair [Z98.890]  Right shoulder pain [M25.511]    SUBJECTIVE  Pain Level (0-10 scale): .5/10    Any medication changes, allergies to medications, adverse drug reactions, diagnosis change, or new procedure performed?: [x] No    [] Yes (see summary sheet for update)  Subjective functional status/changes:   [x] No changes reported  I fell the other day in the snow (not on my arm) ; my arm shot up and I felt a pull in my shoulder and into my arm pit.       OBJECTIVE    Modality rationale: decrease edema and decrease inflammation to reduce irritability and improve the patients functional capacity following her therapy sesison   Min Type Additional Details    [] Estim:  []Unatt       [x]IFC  []Premod                        []Other:  []w/ice   [x]w/heat  Position:sitting  Location: (R) shoulder - anterior and posterior deltoid    [] Estim: []Att    []TENS instruct  [x]NMES                    []Other:  []w/US   []w/ice   []w/heat  Position: in sitting  Location: teres minor/major/infraspinatus and deltoid     []  Traction: [] Cervical       []Lumbar                       [] Prone          []Supine                       []Intermittent   []Continuous Lbs:  [] before manual  [] after manual    []  Ultrasound: []Continuous   [] Pulsed                           []1MHz   []3MHz W/cm2:  Location:    []  Iontophoresis with dexamethasone         Location: [] Take home patch   [] In clinic   15 []  Ice     [x]  heat  []  Ice massage  []  Laser   []  Anodyne Position: in sitting  Location: to shoulder    []  Laser with stim  []  Other:  Position:  Location:    []  Vasopneumatic Device Pressure:       [] lo [x] med [] hi Temperature: [x] lo [] med [] hi   [x] Skin assessment post-treatment:  [x]intact []redness- no adverse reaction    []redness  adverse reaction:     30/25 min Therapeutic Exercise:  [x] See flow sheet : 5 min NC for warm up  Added ball on the wall with CW and CCW  Increases the wand to 2#  Added full cans with 1#     Rationale: increase ROM and increase strength to improve the patients ability to independently move and stabilize her shoulder as tolerated per MD protocol     30 min Manual Therapy:  R Shoulder PROM in supine, prone: External/Internal rotation, scap release to medial scap border in side lying  In supine pectoralis release and sub scap release   Grade II,III joint mobs to increase IR and ER to increase active elevation         Rationale: decrease pain, increase ROM and increase tissue extensibility of R shoulder to promote functional mobility          With   [] TE   [] TA   [] neuro   [] other: Patient Education: [x] Review HEP    [] Progressed/Changed HEP based on:   [] positioning   [] body mechanics   [] transfers   [] heat/ice application    [] other:   [x] Graston Education: Explained the effects and benefits of Graston Technique therapy including potential for post treatment soreness and bruising. Other Objective/Functional Measures: patient is able to perform active shoulder elevation/flexion with less substitution of the upper trap and levator scapula and improved range to approx 120 into flexion/scap/abduction     Pain Level (0-10 scale) post treatment: 0/10    ASSESSMENT/Changes in Function:     Patient will continue to benefit from skilled PT services to modify and progress therapeutic interventions, address functional mobility deficits, address ROM deficits, address strength deficits, analyze and address soft tissue restrictions, analyze and cue movement patterns and assess and modify postural abnormalities to attain remaining goals.      Progress report 12/18/17  New Goals to be achieved in 4 weeks:  Long-term Goals: 4 weeks  1. Patient will improve AROM to WNL compared to L UE to improve performance of ADLs      Progress Note (12/4/17):  In sidelying AROM with flexion 125 and abduction 100      Current:120 flexion after manual in sitting                             125 abduction with slight subsitutation after manual in sitting      2. Patient will be able to perform IR with adduction to assist other hand with donning and doffing the bra      Progress Note (12/4/17): to the top of the sacrum      Current:      3.  Pt will have R shoulder strength 4+/5 to improve activity tolerance.     Progress Note (12/4/17): have not initiated  strength phase yet -       Current: 4/5 MMT flexion/abduction - with pain over the deltoid 12/18/2017    PLAN  [x]  Upgrade activities as tolerated     [x]  Continue plan of care  []  Update interventions per flow sheet       []  Discharge due to:_  []  Other:   Dorfifi Ends, PTA 1/9/2018  11:46 AM    Future Appointments  Date Time Provider Nancy Cantu   1/12/2018 8:30 AM Dorethea Ends, [facility]   1/16/2018 8:30 AM Dorethea Ends, [facility]   1/19/2018 8:30 AM Dorethea Ends, [facility]   1/23/2018 8:30 AM Dorethea Ends, [facility]   1/26/2018 8:30 AM Dorethea Ends, [facility]   1/30/2018 8:30 AM Dorethea Ends, PTA [facility]

## 2018-01-12 ENCOUNTER — HOSPITAL ENCOUNTER (OUTPATIENT)
Dept: PHYSICAL THERAPY | Age: 58
Discharge: HOME | End: 2018-01-12
Payer: COMMERCIAL

## 2018-01-12 PROCEDURE — 97110 THERAPEUTIC EXERCISES: CPT

## 2018-01-12 PROCEDURE — 97140 MANUAL THERAPY 1/> REGIONS: CPT

## 2018-01-12 NOTE — PROGRESS NOTES
PT DAILY TREATMENT NOTE     Patient Name: Rubia Ho  Date:2018  : 1960  [x]  Patient  Verified  Payor: Alexeyvladimir Mcraenancy / Plan: [employer] [id number] PPO / Product Type: PPO /    In time: 8:35  Out time: 9:50  Total Treatment Time (min):75  Visit #: 7 of     Treatment Area: S/P rotator cuff repair [Z98.890]  Right shoulder pain [M25.511]    SUBJECTIVE  Pain Level (0-10 scale): 0/10    Any medication changes, allergies to medications, adverse drug reactions, diagnosis change, or new procedure performed?: [x] No    [] Yes (see summary sheet for update)  Subjective functional status/changes:   [x] No changes reported  I found my weights but they were 3# so I got 2# and I just can't believe how weak my shoulder is       OBJECTIVE    Modality rationale: decrease edema and decrease inflammation to reduce irritability and improve the patients functional capacity following her therapy sesison   Min Type Additional Details    [] Estim:  []Unatt       [x]IFC  []Premod                        []Other:  []w/ice   [x]w/heat  Position:sitting  Location: (R) shoulder - anterior and posterior deltoid    [] Estim: []Att    []TENS instruct  [x]NMES                    []Other:  []w/US   []w/ice   []w/heat  Position: in sitting  Location: teres minor/major/infraspinatus and deltoid     []  Traction: [] Cervical       []Lumbar                       [] Prone          []Supine                       []Intermittent   []Continuous Lbs:  [] before manual  [] after manual    []  Ultrasound: []Continuous   [] Pulsed                           []1MHz   []3MHz W/cm2:  Location:    []  Iontophoresis with dexamethasone         Location: [] Take home patch   [] In clinic   15 []  Ice     [x]  heat  []  Ice massage  []  Laser   []  Anodyne Position: in sitting  Location: to shoulder    []  Laser with stim  []  Other:  Position:  Location:    []  Vasopneumatic Device Pressure:       [] lo [x] med [] hi   Temperature: [x] lo [] med [] hi   [x] Skin assessment post-treatment:  [x]intact []redness- no adverse reaction    []redness  adverse reaction:     30/25 min Therapeutic Exercise:  [x] See flow sheet : 5 min NC for warm up     Rationale: increase ROM and increase strength to improve the patients ability to independently move and stabilize her shoulder as tolerated per MD protocol     30 min Manual Therapy:  R Shoulder PROM in supine, prone: External/Internal rotation, scap release to medial scap border in side lying  In supine pectoralis release and sub scap release   Grade II,III joint mobs to increase IR and ER to increase active elevation         Rationale: decrease pain, increase ROM and increase tissue extensibility of R shoulder to promote functional mobility          With   [] TE   [] TA   [] neuro   [] other: Patient Education: [x] Review HEP    [] Progressed/Changed HEP based on:   [] positioning   [] body mechanics   [] transfers   [] heat/ice application    [] other:   [x] Graston Education: Explained the effects and benefits of Graston Technique therapy including potential for post treatment soreness and bruising. Other Objective/Functional Measures: improved IR and adduction noted able to achieve to L2-3 with no substitution noted - improved joint play noted as well with improved joint glide anterior and posteriorly -    Pain Level (0-10 scale) post treatment: 0/10    ASSESSMENT/Changes in Function:     Patient will continue to benefit from skilled PT services to modify and progress therapeutic interventions, address functional mobility deficits, address ROM deficits, address strength deficits, analyze and address soft tissue restrictions, analyze and cue movement patterns and assess and modify postural abnormalities to attain remaining goals. Progress report 12/18/17  New Goals to be achieved in 4 weeks:  Long-term Goals: 4 weeks  1.  Patient will improve AROM to WNL compared to L UE to improve performance of ADLs      Progress Note (12/4/17):  In sidelying AROM with flexion 125 and abduction 100      Current:120 flexion after manual in sitting                             125 abduction with slight subsitutation after manual in sitting      2. Patient will be able to perform IR with adduction to assist other hand with donning and doffing the bra      Progress Note (12/4/17): to the top of the sacrum      Current:      3.  Pt will have R shoulder strength 4+/5 to improve activity tolerance.     Progress Note (12/4/17): have not initiated  strength phase yet -       Current: 4/5 MMT flexion/abduction - with pain over the deltoid 12/18/2017    PLAN  [x]  Upgrade activities as tolerated     [x]  Continue plan of care  []  Update interventions per flow sheet       []  Discharge due to:_  []  Other:   Enrique Bland PTA 1/12/2018  11:46 AM    Future Appointments  Date Time Provider Nancy Cantu   1/16/2018 8:30 AM Enrique Bland PTA [facility]   1/19/2018 8:30 AM Enrique Bland [facility]   1/23/2018 8:30 AM Enrique Bland, PTA [facility]   1/26/2018 8:30 AM Enrique [facility]   1/30/2018 8:30 AM Enrique Bland PTA [facility]

## 2018-01-16 ENCOUNTER — APPOINTMENT (OUTPATIENT)
Dept: PHYSICAL THERAPY | Age: 58
End: 2018-01-16
Payer: COMMERCIAL

## 2018-01-17 ENCOUNTER — HOSPITAL ENCOUNTER (OUTPATIENT)
Dept: PHYSICAL THERAPY | Age: 58
Discharge: HOME | End: 2018-01-17
Payer: COMMERCIAL

## 2018-01-17 PROCEDURE — 97110 THERAPEUTIC EXERCISES: CPT

## 2018-01-17 PROCEDURE — 97140 MANUAL THERAPY 1/> REGIONS: CPT

## 2018-01-17 NOTE — PROGRESS NOTES
[address] [facility] AT [facility]  [address] [facility] [address], [place], [place]  Phone [phone]  Fax [phone]  PROGRESS NOTE  Patient Name: Chavo Ho : 1960   Treatment/Medical Diagnosis: S/P rotator cuff repair [Z98.890]  Right shoulder pain [M25.511]   Referral Source: Jose Casas MD     Date of Initial Visit: 17 Attended Visits: 40 Missed Visits: 1     SUMMARY OF TREATMENT  Patient has received physical therapy for (R) RCR, SAD - DOS 17 since 17 . Treatment has included therapeutic stretching, strengthen, activities, manual/massage and modalities as need to assist with decreasing pain and increasing function. CURRENT STATUS  Patient has completed 40 visits  Patient reports overall 75-80% improvement since beginning therapy  FOTO (Functional Status Summary)  score is 57/100 was 16/100 initial evaluation - indication of overall functional improvement. ROM - AROM - 130 flexion and 130, 125 abduction and 130 scapation, ER with abduction to base of skull and IR with adduction to approx L2   MMT 4/5 with less pain to deltoid with shoulder flex/abduction  4+/5 with IR and ER with mild discomfort with resisted ER    Patient's remaining chief c/o is\" I would say my biggest deficient is overall strength and endurance with activities and I want my arm to go straight like the other side when it comes to lifting my arm up over my head. \"      Progress report 17  New Goals to be achieved in 4 weeks:  Long-term Goals: 4 weeks  1. Patient will improve AROM to WNL compared to L UE to improve performance of ADLs      Progress Note (17):  In sidelying AROM with flexion 125 and abduction 100      Current:   AROM - 130 flexion and 130, 125 abduction and 130 scapation, ER with abduction to base of skull 18     2.  Patient will be able to perform IR with adduction to assist other hand with donning and doffing the bra      Progress Note (12/4/17): to the top of the sacrum      Current:    IR with adduction to approx L2  1/17/18     3. Pt will have R shoulder strength 4+/5 to improve activity tolerance.     Progress Note (12/4/17): have not initiated  strength phase yet -       Current: MMT 4/5 with less pain to deltoid with shoulder flex/abduction                             4+/5 with IR and ER with mild discomfort with resisted ER - 1/17/18        New Goals to be achieved in 4 weeks:  Long-term Goals: 4 weeks  1. Patient will improve AROM to WNL compared to L UE to improve performance of ADLs      Progress Note (1/17/18): AROM - 130 flexion and 130, 125 abduction and 130 scapation, ER with abduction  to base of skull       Current:      2. Pt will have R shoulder strength 4+/5 to improve activity tolerance. Progress Note (1/17/18): MMT 4/5 with less pain to deltoid with shoulder flex/abduction                             4+/5 with IR and ER with mild discomfort with resisted ER - 1/17/18      Current:     3. Patient will be able to perform IR with adduction to assist other hand with donning and doffing the bra     Progress Note (1/17/18): IR with adduction to approx L2  1/17/18      Current:     RECOMMENDATIONS  Continue with above POC for 2 to 3 times a week for 4 weeks to achieve above goals    If you have any questions/comments please contact us directly at [phone]. Thank you for allowing us to assist in the care of your patient. Therapist Signature: Kait Garcia PTA Date: 1/17/2018     Time: 8:20 AM   NOTE TO PHYSICIAN:  PLEASE COMPLETE THE ORDERS BELOW AND FAX TO   [facility]: [phone].   If you are unable to process this request in 24 hours please contact our office: [phone].    ___ I have read the above report and request that my patient continue as recommended.   ___ I have read the above report and request that my patient continue therapy with the following changes/special instructions:_________________________________________________________   ___ I have read the above report and request that my patient be discharged from therapy.      Physician Signature:        Date:       Time:

## 2018-01-17 NOTE — PROGRESS NOTES
PT DAILY TREATMENT NOTE     Patient Name: Annamarie Ho  Date:2018  : 1960  [x]  Patient  Verified  Payor: [employer]UE [employer] / Plan: Chip Morales [id number] PPO / Product Type: PPO /    In time: 8:00  Out time: 9:20  Total Treatment Time (min):80  Visit #: 8 of     Treatment Area: S/P rotator cuff repair [Z98.890]  Right shoulder pain [M25.511]    SUBJECTIVE  Pain Level (0-10 scale): 0/10    Any medication changes, allergies to medications, adverse drug reactions, diagnosis change, or new procedure performed?: [x] No    [] Yes (see summary sheet for update)  Subjective functional status/changes:   [x] No changes reported  I would say overall since starting therapy I am 75-80% improved - I would say my biggest deficient is overall strength and endurance with activities and  I want my arm to go straight like the other side when it comes to lifting my arm up over my head.       OBJECTIVE    Modality rationale: decrease edema and decrease inflammation to reduce irritability and improve the patients functional capacity following her therapy sesison   Min Type Additional Details    [] Estim:  []Unatt       [x]IFC  []Premod                        []Other:  []w/ice   [x]w/heat  Position:sitting  Location: (R) shoulder - anterior and posterior deltoid    [] Estim: []Att    []TENS instruct  [x]NMES                    []Other:  []w/US   []w/ice   []w/heat  Position: in sitting  Location: teres minor/major/infraspinatus and deltoid     []  Traction: [] Cervical       []Lumbar                       [] Prone          []Supine                       []Intermittent   []Continuous Lbs:  [] before manual  [] after manual    []  Ultrasound: []Continuous   [] Pulsed                           []1MHz   []3MHz W/cm2:  Location:    []  Iontophoresis with dexamethasone         Location: [] Take home patch   [] In clinic   15 []  Ice     [x]  heat  []  Ice massage  []  Laser   []  Anodyne Position: in sitting  Location: to shoulder    []  Laser with stim  []  Other:  Position:  Location:    []  Vasopneumatic Device Pressure:       [] lo [x] med [] hi   Temperature: [x] lo [] med [] hi   [x] Skin assessment post-treatment:  [x]intact []redness- no adverse reaction    []redness  adverse reaction:     35/30 min Therapeutic Exercise:  [x] See flow sheet : 5 min NC for warm up     Rationale: increase ROM and increase strength to improve the patients ability to independently move and stabilize her shoulder as tolerated per MD protocol     30 min Manual Therapy:  R Shoulder PROM in supine, prone: External/Internal rotation, scap release to medial scap border in side lying  In supine pectoralis release and sub scap release   Grade II,III joint mobs to increase IR and ER to increase active elevation         Rationale: decrease pain, increase ROM and increase tissue extensibility of R shoulder to promote functional mobility          With   [] TE   [] TA   [] neuro   [] other: Patient Education: [x] Review HEP    [] Progressed/Changed HEP based on:   [] positioning   [] body mechanics   [] transfers   [] heat/ice application    [] other:   [x] Graston Education: Explained the effects and benefits of Graston Technique therapy including potential for post treatment soreness and bruising.      Other Objective/Functional Measures: FOTO score is 57/100 was 60/100 on 12/4/17 on initial evaluation was 16/100        ROM - AROM - 130 flexion and 130, 125 abduction and 130 scapation, ER with abduction to base of skull and IR with adduction to approx L2         MMT 4/5 with less pain to deltoid with shoulder flex/abduction         4+/5 with IR and ER with mild discomfort with resisted ER    Pain Level (0-10 scale) post treatment: 0/10    ASSESSMENT/Changes in Function:     Patient will continue to benefit from skilled PT services to modify and progress therapeutic interventions, address functional mobility deficits, address ROM deficits, address strength deficits, analyze and address soft tissue restrictions, analyze and cue movement patterns and assess and modify postural abnormalities to attain remaining goals. Progress report 12/18/17  New Goals to be achieved in 4 weeks:  Long-term Goals: 4 weeks  1. Patient will improve AROM to WNL compared to L UE to improve performance of ADLs      Progress Note (12/4/17):  In sidelying AROM with flexion 125 and abduction 100      Current:   AROM - 130 flexion and 130, 125 abduction and 130 scapation, ER with abduction to base of skull 1/17/18    2. Patient will be able to perform IR with adduction to assist other hand with donning and doffing the bra      Progress Note (12/4/17): to the top of the sacrum      Current:    IR with adduction to approx L2  1/17/18    3.  Pt will have R shoulder strength 4+/5 to improve activity tolerance.     Progress Note (12/4/17): have not initiated  strength phase yet -       Current: MMT 4/5 with less pain to deltoid with shoulder flex/abduction        4+/5 with IR and ER with mild discomfort with resisted ER - 1/17/18    PLAN  [x]  Upgrade activities as tolerated     [x]  Continue plan of care  []  Update interventions per flow sheet       []  Discharge due to:_  [x]  Other: Sorlasraheel [address], PTA 1/17/2018  11:46 AM    Future Appointments  Date Time Provider Nancy Cantu   1/24/2018 8:00 AM Tita Lancaster PTA [facility]   1/31/2018 8:00 AM Tita Lancaster PTA [facility]

## 2018-01-19 ENCOUNTER — APPOINTMENT (OUTPATIENT)
Dept: PHYSICAL THERAPY | Age: 58
End: 2018-01-19
Payer: COMMERCIAL

## 2018-01-23 ENCOUNTER — APPOINTMENT (OUTPATIENT)
Dept: PHYSICAL THERAPY | Age: 58
End: 2018-01-23
Payer: COMMERCIAL

## 2018-01-24 ENCOUNTER — HOSPITAL ENCOUNTER (OUTPATIENT)
Dept: PHYSICAL THERAPY | Age: 58
Discharge: HOME | End: 2018-01-24
Payer: COMMERCIAL

## 2018-01-24 PROCEDURE — 97140 MANUAL THERAPY 1/> REGIONS: CPT

## 2018-01-24 PROCEDURE — 97110 THERAPEUTIC EXERCISES: CPT

## 2018-01-24 NOTE — PROGRESS NOTES
PT DAILY TREATMENT NOTE     Patient Name: Torri Ho  Date:2018  : 1960  [x]  Patient  Verified  Payor: BLUE CROSS / Plan: [facility] PPO / Product Type: PPO /    In time: 8:03  Out time: 9:25  Total Treatment Time (min):82  Visit #: 1 of 5    Treatment Area: S/P rotator cuff repair [Z98.890]  Right shoulder pain [M25.511]    SUBJECTIVE  Pain Level (0-10 scale): 0/10    Any medication changes, allergies to medications, adverse drug reactions, diagnosis change, or new procedure performed?: [x] No    [] Yes (see summary sheet for update)  Subjective functional status/changes:   [x] No changes reported    I am good but I am just exhausted from the week - but I was able to play my first game on the league for pool and I won - I was just really tired in the arm afterwards.     OBJECTIVE    Modality rationale: decrease edema and decrease inflammation to reduce irritability and improve the patients functional capacity following her therapy sesison   Min Type Additional Details    [] Estim:  []Unatt       [x]IFC  []Premod                        []Other:  []w/ice   [x]w/heat  Position:sitting  Location: (R) shoulder - anterior and posterior deltoid    [] Estim: []Att    []TENS instruct  [x]NMES                    []Other:  []w/US   []w/ice   []w/heat  Position: in sitting  Location: teres minor/major/infraspinatus and deltoid     []  Traction: [] Cervical       []Lumbar                       [] Prone          []Supine                       []Intermittent   []Continuous Lbs:  [] before manual  [] after manual    []  Ultrasound: []Continuous   [] Pulsed                           []1MHz   []3MHz W/cm2:  Location:    []  Iontophoresis with dexamethasone         Location: [] Take home patch   [] In clinic   15 []  Ice     [x]  heat  []  Ice massage  []  Laser   []  Anodyne Position: in sitting  Location: to shoulder    []  Laser with stim  []  Other:  Position:  Location:    [] Vasopneumatic Device Pressure:       [] lo [x] med [] hi   Temperature: [x] lo [] med [] hi   [x] Skin assessment post-treatment:  [x]intact []redness- no adverse reaction    []redness  adverse reaction:     37/32 min Therapeutic Exercise:  [x] See flow sheet : 5 min NC for warm up  Added prone over red SB - shoulder/scap - extension, scaption and abduction   Wall clocks with yellow t-band  Green t-band with all 6 way shoulder  2# wand with shoulder extension and IR     Rationale: increase ROM and increase strength to improve the patients ability to independently move and stabilize her shoulder as tolerated per MD protocol     30 min Manual Therapy:  R Shoulder PROM in supine, prone: External/Internal rotation, scap release to medial scap border in side lying  In supine pectoralis release and sub scap release   Grade II,III joint mobs to increase IR and ER to increase active elevation         Rationale: decrease pain, increase ROM and increase tissue extensibility of R shoulder to promote functional mobility          With   [] TE   [] TA   [] neuro   [] other: Patient Education: [x] Review HEP    [] Progressed/Changed HEP based on:   [] positioning   [] body mechanics   [] transfers   [] heat/ice application    [] other:   [x] Graston Education: Explained the effects and benefits of Graston Technique therapy including potential for post treatment soreness and bruising.      Other Objective/Functional Measures: patient presents with improved strength with all exercises - patient is able to perform lateral and front raises with 3# and green t-band without UT substitution with shoulder flexion/abduction   Was able to perform prone over SB with extension, abduction and scaption with good strength and ROM    Pain Level (0-10 scale) post treatment: 0/10    ASSESSMENT/Changes in Function:     Patient will continue to benefit from skilled PT services to modify and progress therapeutic interventions, address functional mobility deficits, address ROM deficits, address strength deficits, analyze and address soft tissue restrictions, analyze and cue movement patterns and assess and modify postural abnormalities to attain remaining goals. Progress report 1/17/18  New Goals to be achieved in 4 weeks:  Long-term Goals: 4 weeks  1. Patient will improve AROM to WNL compared to L UE to improve performance of ADLs      Progress Note (1/17/18): AROM - 130 flexion and 130, 125 abduction and 130 scapation, ER with abduction                  to base of skull       Current:       2. Pt will have R shoulder strength 4+/5 to improve activity tolerance. Progress Note (1/17/18): MMT 4/5 with less pain to deltoid with shoulder flex/abduction                             4+/5 with IR and ER with mild discomfort with resisted ER - 1/17/18      Current:      3. Patient will be able to perform IR with adduction to assist other hand with donning and doffing the bra     Progress Note (1/17/18):  IR with adduction to approx L2  1/17/18      Current:     PLAN  [x]  Upgrade activities as tolerated     [x]  Continue plan of care  []  Update interventions per flow sheet       []  Discharge due to:_  []  Other:    Javy Argueta PTA 1/24/2018  11:46 AM    Future Appointments  Date Time Provider Nancy Cantu   1/31/2018 8:00 AM Javy Argueta PTA [facility]

## 2018-01-26 ENCOUNTER — APPOINTMENT (OUTPATIENT)
Dept: PHYSICAL THERAPY | Age: 58
End: 2018-01-26
Payer: COMMERCIAL

## 2018-01-30 ENCOUNTER — APPOINTMENT (OUTPATIENT)
Dept: PHYSICAL THERAPY | Age: 58
End: 2018-01-30
Payer: COMMERCIAL

## 2018-01-31 ENCOUNTER — APPOINTMENT (OUTPATIENT)
Dept: PHYSICAL THERAPY | Age: 58
End: 2018-01-31
Payer: COMMERCIAL

## 2018-02-03 NOTE — PROGRESS NOTES
PT DAILY TREATMENT NOTE     Patient Name: Beronica Ho  Date:2017  : 1960  [x]  Patient  Verified  Payor: [place]UE [place] / Plan: Chip Morales [id number] PPO / Product Type: PPO /    In time:8:37   Out time: 9:47  Total Treatment Time (min):70  Visit #: 9 of     Treatment Area: S/P rotator cuff repair [Z98.890]  Right shoulder pain [M25.511]    SUBJECTIVE  Pain Level (0-10 scale): 1/10  Any medication changes, allergies to medications, adverse drug reactions, diagnosis change, or new procedure performed?: [x] No    [] Yes (see summary sheet for update)  Subjective functional status/changes:   [x] No changes reported  I would say overall I am 60% improved since starting therapy    OBJECTIVE    Modality rationale: decrease edema and decrease inflammation to reduce irritability and improve the patients functional capacity following her therapy babitaison   Min Type Additional Details   15+5 set up [x] Estim:  [x]Unatt       []IFC  [x]Premod                        []Other:  []w/ice   [x]w/heat  Position:sitting  Location: (R) shoulder - anterior and posterior deltoid    [] Estim: []Att    []TENS instruct  []NMES                    []Other:  []w/US   []w/ice   []w/heat  Position:  Location:    []  Traction: [] Cervical       []Lumbar                       [] Prone          []Supine                       []Intermittent   []Continuous Lbs:  [] before manual  [] after manual    []  Ultrasound: []Continuous   [] Pulsed                           []1MHz   []3MHz W/cm2:  Location:    []  Iontophoresis with dexamethasone         Location: [] Take home patch   [] In clinic    []  Ice     [x]  heat  []  Ice massage  []  Laser   []  Anodyne Position: in sitting  Location: to shoulder    []  Laser with stim  []  Other:  Position:  Location:    []  Vasopneumatic Device Pressure:       [] lo [x] med [] hi   Temperature: [x] lo [] med [] hi   [x] Skin assessment post-treatment:  [x]intact []redness- no adverse reaction    []redness  adverse reaction:     20/15 min Therapeutic Exercise:  [x] See flow sheet : 5 min NC for warm up    Rationale: increase ROM and increase strength to improve the patients ability to independently move and stabilize her shoulder as tolerated per MD protocol     30 min Manual Therapy:  R Shoulder PROM in supine: External/Internal rotation, scap release to medial scap border in side lying  In supine pectoralis release and sub scap release   Performed passive bicep stretch in standing    Rationale: decrease pain, increase ROM and increase tissue extensibility of R shoulder to promote functional mobility          With   [] TE   [] TA   [] neuro   [] other: Patient Education: [x] Review HEP    [] Progressed/Changed HEP based on:   [] positioning   [] body mechanics   [] transfers   [] heat/ice application    [] other:      Other Objective/Functional Measures: FOTO score is 60/100 was 45/100 on last reassessment and 16/100       on evaluation         MD f/u is 12/12/17        In sidelying AROM with flexion 125 and abduction 100        PROM in sitting 141 with flexion and scaption 130        IR with adduction top of sacrum         ER at 90/90 is 70 degrees        IR at 90/90 is 40 degrees        Pain Level (0-10 scale) post treatment: 0/10    ASSESSMENT/Changes in Function:     Patient will continue to benefit from skilled PT services to modify and progress therapeutic interventions, address functional mobility deficits, address ROM deficits, address strength deficits, analyze and address soft tissue restrictions, analyze and cue movement patterns and assess and modify postural abnormalities to attain remaining goals.      Progress towards goals / Updated goals:  New Goals to be achieved in __4__  weeks:  1.  Pt will be able to progress to active assisted/AROM activities up to 90 degrees pending MD clearance Met, 11/16/17   2.  Patient will report > 50% functional improvement with washing, dressing ADL's MET 12/4/17   3.  Patient will improve AROM to WNL compared to L UE to improve performance of ADLs PROGRESSING TOWARDS 12/4/17     PLAN  [x]  Upgrade activities as tolerated     [x]  Continue plan of care  []  Update interventions per flow sheet       []  Discharge due to:_  [x]  Other: [address] Schuster \[facility]\"" 12/4/2017  11:46 AM    Future Appointments  Date Time Provider Nancy Cantu   12/6/2017 8:30 AM Alireza Senate, PTA [facility]   12/8/2017 8:00 AM Alireza Senate, PTA [facility]   12/11/2017 8:30 AM Alireza Senate, PTA [facility]   12/13/2017 8:30 AM Alireza Senate, PTA [facility]   12/15/2017 8:30 AM Alireza Senate, PTA [facility]   12/18/2017 8:30 AM Alireza Senate, PTA [facility]   12/20/2017 8:30 AM Alireza Senate, PTA [facility]   12/22/2017 8:30 AM Alireza Senate, PTA [facility]   12/26/2017 8:30 AM Alireza Senate, PTA [facility]   12/27/2017 8:30 AM Alireza Senate, PTA [facility]   12/29/2017 8:30 AM Alireza Fernando PTA [facility] Infectious Disease

## 2018-02-07 ENCOUNTER — HOSPITAL ENCOUNTER (OUTPATIENT)
Dept: PHYSICAL THERAPY | Age: 58
Discharge: HOME | End: 2018-02-07
Payer: COMMERCIAL

## 2018-02-07 PROCEDURE — 97014 ELECTRIC STIMULATION THERAPY: CPT

## 2018-02-07 PROCEDURE — 97110 THERAPEUTIC EXERCISES: CPT

## 2018-02-07 PROCEDURE — 97140 MANUAL THERAPY 1/> REGIONS: CPT

## 2018-02-07 NOTE — PROGRESS NOTES
PT DAILY TREATMENT NOTE     Patient Name: Ginger Ho  Date:2018  : 1960  [x]  Patient  Verified  Payor: BLUE CROSS / Plan: Chip Morales [id number] PPO / Product Type: PPO /    In time: 10:30Out time: 11:42  Total Treatment Time (min):72  Visit #: 2 of 5    Treatment Area: S/P rotator cuff repair [Z98.890]  Right shoulder pain [M25.511]    SUBJECTIVE  Pain Level (0-10 scale): 0/10    Any medication changes, allergies to medications, adverse drug reactions, diagnosis change, or new procedure performed?: [x] No    [] Yes (see summary sheet for update)  Subjective functional status/changes:   [x] No changes reported    I was sick last week so I have had a little bit of a time to get my exercises going again so I can feel the weakness in it.     OBJECTIVE    Modality rationale: decrease edema and decrease inflammation to reduce irritability and improve the patients functional capacity following her therapy sesison   Min Type Additional Details   15+5 set up [x] Estim:  [x]Unatt       []IFC  [x]Premod                        []Other:  []w/ice   [x]w/heat  Position:sitting  Location: (R) shoulder - anterior and posterior deltoid    [] Estim: []Att    []TENS instruct  [x]NMES                    []Other:  []w/US   []w/ice   []w/heat  Position: in sitting  Location: teres minor/major/infraspinatus and deltoid     []  Traction: [] Cervical       []Lumbar                       [] Prone          []Supine                       []Intermittent   []Continuous Lbs:  [] before manual  [] after manual    []  Ultrasound: []Continuous   [] Pulsed                           []1MHz   []3MHz W/cm2:  Location:    []  Iontophoresis with dexamethasone         Location: [] Take home patch   [] In clinic    []  Ice     [x]  heat  []  Ice massage  []  Laser   []  Anodyne Position: in sitting  Location: to shoulder    []  Laser with stim  []  Other:  Position:  Location:    []  Vasopneumatic Device Pressure:       [] lo [x] med [] hi   Temperature: [x] lo [] med [] hi   [x] Skin assessment post-treatment:  [x]intact []redness- no adverse reaction    []redness  adverse reaction:     32/27 min Therapeutic Exercise:  [x] See flow sheet : 5 min NC for warm up     Rationale: increase ROM and increase strength to improve the patients ability to independently move and stabilize her shoulder as tolerated per MD protocol     20 min Manual Therapy:  R Shoulder PROM in supine, prone: External/Internal rotation, scap release to medial scap border in side lying  In supine pectoralis release and sub scap release   Grade II,III joint mobs to increase IR and ER to increase active elevation         Rationale: decrease pain, increase ROM and increase tissue extensibility of R shoulder to promote functional mobility          With   [] TE   [] TA   [] neuro   [] other: Patient Education: [x] Review HEP    [] Progressed/Changed HEP based on:   [] positioning   [] body mechanics   [] transfers   [] heat/ice application    [] other:   [x] Graston Education: Explained the effects and benefits of Graston Technique therapy including potential for post treatment soreness and bruising. Other Objective/Functional Measures: good PROM noted - continue with POC to increase ROM and function with ADLs  Pain Level (0-10 scale) post treatment: 0/10    ASSESSMENT/Changes in Function:     Patient will continue to benefit from skilled PT services to modify and progress therapeutic interventions, address functional mobility deficits, address ROM deficits, address strength deficits, analyze and address soft tissue restrictions, analyze and cue movement patterns and assess and modify postural abnormalities to attain remaining goals. Progress report 1/17/18  New Goals to be achieved in 4 weeks:  Long-term Goals: 4 weeks  1.  Patient will improve AROM to WNL compared to L UE to improve performance of ADLs      Progress Note (1/17/18): AROM - 130 flexion and 130, 125 abduction and 130 scapation, ER with abduction                  to base of skull       Current:       2. Pt will have R shoulder strength 4+/5 to improve activity tolerance. Progress Note (1/17/18): MMT 4/5 with less pain to deltoid with shoulder flex/abduction                             4+/5 with IR and ER with mild discomfort with resisted ER - 1/17/18      Current:      3. Patient will be able to perform IR with adduction to assist other hand with donning and doffing the bra     Progress Note (1/17/18):  IR with adduction to approx L2  1/17/18      Current:     PLAN  [x]  Upgrade activities as tolerated     [x]  Continue plan of care  []  Update interventions per flow sheet       []  Discharge due to:_  []  Other:    Janeth Graham PTA 2/7/2018  11:46 AM    Future Appointments  Date Time Provider Nancy Cantu   2/14/2018 8:00 AM Janeth Graham PTA [facility]   2/21/2018 8:00 AM Janeth Graham PTA [facility]

## 2018-02-14 ENCOUNTER — HOSPITAL ENCOUNTER (OUTPATIENT)
Dept: PHYSICAL THERAPY | Age: 58
Discharge: HOME | End: 2018-02-14
Payer: COMMERCIAL

## 2018-02-14 PROCEDURE — 97110 THERAPEUTIC EXERCISES: CPT

## 2018-02-14 PROCEDURE — 97140 MANUAL THERAPY 1/> REGIONS: CPT

## 2018-02-14 NOTE — PROGRESS NOTES
PT DAILY TREATMENT NOTE     Patient Name: Jerry Ho  Date:2018  : 1960  [x]  Patient  Verified  Payor: Natali Guevara / Plan: Chip Morales [id number] PPO / Product Type: PPO /    In time: 8:00Out time: 9:18  Total Treatment Time (min):78  Visit #: 3 of 5    Treatment Area: S/P rotator cuff repair [Z98.890]  Right shoulder pain [M25.511]    SUBJECTIVE  Pain Level (0-10 scale): 0/10    Any medication changes, allergies to medications, adverse drug reactions, diagnosis change, or new procedure performed?: [x] No    [] Yes (see summary sheet for update)  Subjective functional status/changes:   [x] No changes reported  [facility] 3 times a week and I know that I am doing better, I also had a massage yesterday and that helped as well       OBJECTIVE    Modality rationale: decrease edema and decrease inflammation to reduce irritability and improve the patients functional capacity following her therapy sesison   Min Type Additional Details    [] Estim:  [x]Unatt       []IFC  [x]Premod                        []Other:  []w/ice   [x]w/heat  Position:sitting  Location: (R) shoulder - anterior and posterior deltoid    [] Estim: []Att    []TENS instruct  [x]NMES                    []Other:  []w/US   []w/ice   []w/heat  Position: in sitting  Location: teres minor/major/infraspinatus and deltoid     []  Traction: [] Cervical       []Lumbar                       [] Prone          []Supine                       []Intermittent   []Continuous Lbs:  [] before manual  [] after manual    []  Ultrasound: []Continuous   [] Pulsed                           []1MHz   []3MHz W/cm2:  Location:    []  Iontophoresis with dexamethasone         Location: [] Take home patch   [] In clinic   15 []  Ice     [x]  heat  []  Ice massage  []  Laser   []  Anodyne Position: in sitting  Location: to shoulder    []  Laser with stim  []  Other:  Position:  Location:    []  Vasopneumatic Device Pressure:       [] lo [x] med [] hi   Temperature: [x] lo [] med [] hi   [x] Skin assessment post-treatment:  [x]intact []redness- no adverse reaction    []redness  adverse reaction:     43/33 min Therapeutic Exercise:  [x] See flow sheet : 10 min NC for warm up  Added C.C. Lat pull downs  Triple threat with red t-band      Rationale: increase ROM and increase strength to improve the patients ability to independently move and stabilize her shoulder as tolerated per MD protocol     20 min Manual Therapy:  R Shoulder PROM in supine, prone: External/Internal rotation, scap release to medial scap border in side lying  In supine pectoralis release and sub scap release   Grade II,III joint mobs to increase IR and ER to increase active elevation         Rationale: decrease pain, increase ROM and increase tissue extensibility of R shoulder to promote functional mobility          With   [] TE   [] TA   [] neuro   [] other: Patient Education: [x] Review HEP    [] Progressed/Changed HEP based on:   [] positioning   [] body mechanics   [] transfers   [] heat/ice application    [] other:   [x] Graston Education: Explained the effects and benefits of Graston Technique therapy including potential for post treatment soreness and bruising. Other Objective/Functional Measures: patient is progressing well with increasing strength and overall muscle endurance  With all exercise - patient has begun to work out at the gym and is able to tolerate increase activities at home with less reports of fatigue and pain     Pain Level (0-10 scale) post treatment: 0/10    ASSESSMENT/Changes in Function:     Patient will continue to benefit from skilled PT services to modify and progress therapeutic interventions, address functional mobility deficits, address ROM deficits, address strength deficits, analyze and address soft tissue restrictions, analyze and cue movement patterns and assess and modify postural abnormalities to attain remaining goals.      Progress report 1/17/18  New Goals to be achieved in 4 weeks:  Long-term Goals: 4 weeks  1. Patient will improve AROM to WNL compared to L UE to improve performance of ADLs      Progress Note (1/17/18): AROM - 130 flexion and 130, 125 abduction and 130 scapation, ER with abduction                  to base of skull       Current:       2. Pt will have R shoulder strength 4+/5 to improve activity tolerance. Progress Note (1/17/18): MMT 4/5 with less pain to deltoid with shoulder flex/abduction                             4+/5 with IR and ER with mild discomfort with resisted ER - 1/17/18      Current:      3. Patient will be able to perform IR with adduction to assist other hand with donning and doffing the bra     Progress Note (1/17/18):  IR with adduction to approx L2  1/17/18      Current:     PLAN  [x]  Upgrade activities as tolerated     [x]  Continue plan of care  []  Update interventions per flow sheet       []  Discharge due to:_  []  Other:    Tita Lancaster PTA 2/14/2018  11:46 AM    Future Appointments  Date Time Provider Nancy Cantu   2/21/2018 8:00 AM Tita Lancaster PTA [facility]

## 2018-02-21 ENCOUNTER — HOSPITAL ENCOUNTER (OUTPATIENT)
Dept: PHYSICAL THERAPY | Age: 58
Discharge: HOME | End: 2018-02-21
Payer: COMMERCIAL

## 2018-02-21 PROCEDURE — 97110 THERAPEUTIC EXERCISES: CPT

## 2018-02-21 PROCEDURE — 97140 MANUAL THERAPY 1/> REGIONS: CPT

## 2018-04-30 NOTE — PROGRESS NOTES
[address] [facility] AT [facility]  [address], [place], [place]  Phone [phone]  Fax  SUMMARY  Patient Name: Carleen Ho : 1960   Treatment/Medical Diagnosis: S/P rotator cuff repair [Z98.890]  Right shoulder pain [M25.511]   Referral Source: Rubin Covington MD     Date of Initial Visit: 2017 Attended Visits: 44 Missed Visits: 3       Summary of Care: Thank you for referring this pleasant patient. Ruthann Kwong has completed 44 of 45 proposed sessions. Patient did not follow up with her last remaining visit to perform a formal discharge with update ROM/stretnght measurements and FOTO. Patient has not return to therapy since 2018 - will d/c patient at this time and will advise patient that is any additional follow up or recommendation is needed to return to referring physician. Reason for Discharge:  [] The patient was non-compliant with attendance. [x] The patient could not be contacted to schedule further therapy sessions. [] The patient has been discharged based on the orders of the referring physician.  [] The patient has requested to be discharged due to:   [] Patient has met all goals or max benefit has been achieved      If you have any questions/comments please contact us directly at [phone]. Thank you for allowing us to assist in the care of your patient.     Therapist Signature: Kajal Rushing PTA Date: 2018     Time: 2:54 PM

## 2022-01-24 NOTE — PROGRESS NOTES
PT DAILY TREATMENT NOTE     Patient Name: Elkin Ho  Date:10/23/2017  : 1960  [x]  Patient  Verified  Payor: BLUE CROSS / Plan: Chip Morales [id number] PPO / Product Type: PPO /    In time: 7:00 Out time: 8:00  Total Treatment Time (min): 60  Visit #: 6  of  (12)    Treatment Area: S/P rotator cuff repair [Z98.890]  Right shoulder pain [M25.511]    SUBJECTIVE  Pain Level IN: (0-10 scale): 2/10  Pain Level OUT: (0-10 scale) post treatment: 0/10    Any medication changes, allergies to medications, adverse drug reactions, diagnosis change, or new procedure performed?: [x] No    [] Yes (see summary sheet for update)  Subjective functional status/changes:   [x] No changes reported  I was up at 1:30am this morning because I was so nauseous from the pain - I don't know what to do, I have pain and it makes me nauseous - it is not the pain medication that makes me sick but just the pain     OBJECTIVE    Modality rationale: decrease edema, decrease inflammation and decrease pain to improve the patients ability to increase shoulder ROM   Min Type Additional Details    [] Estim:  []Unatt       []IFC  []Premod                        []Other:  []w/ice   []w/heat  Position:  Location:    [] Estim: []Att    []TENS instruct  []NMES                    []Other:  []w/US   []w/ice   []w/heat  Position:  Location:    []  Traction: [] Cervical       []Lumbar                       [] Prone          []Supine                       []Intermittent   []Continuous Lbs:  [] before manual  [] after manual    []  Ultrasound: []Continuous   [] Pulsed                           []1MHz   []3MHz W/cm2:  Location:    []  Iontophoresis with dexamethasone         Location: [] Take home patch   [] In clinic    []  Ice     []  heat  []  Ice massage  []  Laser   []  Anodyne Position:in reclined  Location:(R) shoulder     []  Laser with stim  []  Other:  Position:  Location:   15+ 5min set up [x]  Vasopneumatic Device Pressure: [] lo [x] med [] hi   Temperature: [x] lo [] med [] hi   [] Skin assessment post-treatment:  []intact []redness- no adverse reaction    []redness  adverse reaction:       20 min Therapeutic Exercise:  [x] See flow sheet :    Rationale: increase ROM and increase strength to improve the patients ability to use the (R) shoulder per protocol      20 min Manual Therapy: In semi-reclined position - PROM   Rationale: decrease pain, increase ROM, increase tissue extensibility and decrease edema  to (R) shoulder             With   [] TE   [] TA   [] neuro   [] other: Patient Education: [x] Review HEP    [] Progressed/Changed HEP based on:   [] positioning   [] body mechanics   [] transfers   [x] heat/ice application    [] other:       Other Objective/Functional Measures: continue with current protocol until 11/6 when patient will be 6 weeks post-op. Patient tolerates PROM within scaption range vs into straight flexion - less reports of \"pinching\" or \"grabbing\" and muscle guarding felt      ASSESSMENT/Changes in Function:      Patient will continue to benefit from skilled PT services to modify and progress therapeutic interventions, address functional mobility deficits, address ROM deficits, address strength deficits, analyze and address soft tissue restrictions and instruct in home and community integration to attain remaining goals. [x]  See Plan of Care  []  See progress note/recertification  []  See Discharge Summary         Progress towards goals / Updated goals: · Short Term Goals: To be accomplished in  4  weeks:  1. Patient will demonstrate at least 140 degrees passive flexion. MET 10/9/17  2. Independent/compliant with appropriate HEP for RUE strength/ROM as per protocol. MET performing daily  10-11-17  3. Patient will report 50% improvement in sleep quality stemming from shoulder symptoms 25% 10-20-17   · Long Term Goals: To be accomplished in  6-8  weeks:  1.   Pt will be able to progress to active assisted/AROM activities up to 90 degrees pending MD clearance  2. Improve FOTO outcome score by 20-25% to indicate a significant improvement in ADL function  3.   Patient will report > 50% functional improvement with washing, dressing ADL's     PLAN  []  Upgrade activities as tolerated     [x]  Continue plan of care  []  Update interventions per flow sheet       []  Discharge due to:_  []  Other:_  Please refer to progress report     Alireza Fernando PTA,    10/23/2017      Future Appointments  Date Time Provider Nancy Cantu   10/25/2017 4:30 PM Alireza Fernando PTA [facility]   10/27/2017 9:00 AM COREY YenT [facility]   10/30/2017 5:30 PM Alireza Fernando PTA [facility]   11/1/2017 8:00 AM William Walker PT [facility]   11/3/2017 9:00 AM [facility] PT THALIA 1 [facility] Daja-PGY3: 20 year old female with no pertinent PMH presents with right ankle pain s/p slip and fall 2 days ago. Pt seen by Dr. San and found to have multiple ankle fractures, sent in for admission for operative repair. Denies any fevers, chest pain, shortness of breath, abdominal pain, vomiting, diarrhea, headache, vision change, numbness, weakness, or rash. Discussed with orthopedics, accepted for admission. Will obtain pre-op labs and admit for further evaluation/management.

## 2023-03-10 ENCOUNTER — HOSPITAL ENCOUNTER (OUTPATIENT)
Facility: HOSPITAL | Age: 63
Setting detail: RECURRING SERIES
Discharge: HOME | End: 2023-03-13
Payer: COMMERCIAL

## 2023-03-10 PROCEDURE — 97535 SELF CARE MNGMENT TRAINING: CPT

## 2023-03-10 PROCEDURE — 97162 PT EVAL MOD COMPLEX 30 MIN: CPT

## 2023-03-10 NOTE — PROGRESS NOTES
PHYSICAL / OCCUPATIONAL THERAPY - DAILY TREATMENT NOTE (updated )  For Eval visit    Patient Name: Romona Duverney Gleason    Date: 3/10/2023    : 1960  Insurance: Payor: Kashif Ambrosio / Plan: Kashif Ambrosio HMO / Product Type: *No Product type* /      Patient  verified yes     Visit #   Current / Total 1 8-12   Time   In / Out 8:41 9:24   Pain   In / Out 2/10 2/10   Subjective Functional Status/Changes: See POC   Changes to:  Meds, Allergies, Med Hx, Sx Hx? If yes, update Summary List no       TREATMENT AREA =  Other low back pain [M54.59]    OBJECTIVE           33 min   Eval - untimed                      Therapeutic Procedures: Tx Min Billable or 1:1 Min (if diff from Tx Min) Procedure, Rationale, Specifics   10  25221 Self Care/Home Management (timed):  improve patient knowledge and understanding of pain reducing techniques, positioning, posture/ergonomics, activity modification, diagnosis/prognosis, physical therapy expectations, procedures and progression, and HEP  to improve patient's ability to progress to PLOF and address remaining functional goals.   (see flow sheet as applicable)     Details if applicable:  see chart copy for HEP          Details if applicable:            Details if applicable:            Details if applicable:     10  MC BC Totals Reminder: bill using total billable min of TIMED therapeutic procedures (example: do not include dry needle or estim unattended, both untimed codes, in totals to left)  8-22 min = 1 unit; 23-37 min = 2 units; 38-52 min = 3 units; 53-67 min = 4 units; 68-82 min = 5 units   Total Total     [x]  Patient Education billed concurrently with other procedures   [x] Review HEP    [] Progressed/Changed HEP, detail:    [] Other detail:       Objective Information/Functional Measures/Assessment    See POC    Patient will continue to benefit from skilled PT / OT services to modify and progress therapeutic interventions, analyze and address functional mobility deficits, analyze and address ROM deficits, analyze and address strength deficits, analyze and address soft tissue restrictions, analyze and cue for proper movement patterns, analyze and modify for postural abnormalities, and instruct in home and community integration to address functional deficits and attain remaining goals.     Progress toward goals / Updated goals:  [x]  See POC    See POC    PLAN  yes Continue plan of care  []  Upgrade activities as tolerated  []  Discharge due to :  [x]  Other: pt to transfer to [address] [place] [address] location 2' geographical purposes    Ashley molina PT    3/10/2023    7:46 AM    Future Appointments   Date Time Provider Jayne Stone   3/10/2023  8:30 AM Ashley molina PT [facility]

## 2023-03-10 NOTE — THERAPY EVALUATION
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 Pt notes pain ranges 3 to 8/10, made worse with bending forward, household chores, lifting, prolonged sitting, static standing, prolonged walking; better with yoga/stretching, lidocaine patches. Describes pain as aching, throbbing/burning, located primarily L flank. Pt reports occasional radicular sx to the R LE. Prior treatment has included AJAY, facet injections, SIJ injections, RFA all with good relief, last being in . Was told she is no longer a candidate for injections 2' age. Prior tx has included monthly massages. Red flags negative. FOTO 54/100. Clinical Exam Findings:  POSTURE/OBSERVATION: significant dextroscoliosis , R scapular medial border/inferior angle winging, prominent R rib hump. FUNCTIONAL ASSESSMENT: gait is non-antalgic stairs reciprocal ; heel walk/toe walk normal; squat to ~50 deg, significant genu valgum angulation; bed mobility independent, pain with rolling prone to supine, poor mechanics supine<>sit; bridge 90%; TA isometric fair+, fair maintenance with LE extension.   BALANCE ASSESSMENT: (performed 2' pt reporting fear of falling) tandem R 30\"/L 30\" (EO); SLS R 17\"/L14\" w/ increased ankle strategy  ROM: lumbar AROM flex to toes \"tight\" p!, ext WNL, R SB to to mid thigh, L SB to mid thigh!, RR 50%, LR 50%      STRENGTH AROM PROM   Hip Left Right Left Right Left Right   Flexion 4/5 4/5 120 120      Extension 3-/5 3-/5 p!   (SL) 8  (SL) -5   Abduction 4-/5 4/5       ER 4/5 4/5 50 43     IR 4-/5 4-/5 33 p! 39     Knee Left Right Left Right Left Right   Extension 4+/5 4+/5   HS -8 HS -10   Flexion 4/5 4/5       Ankle Left Right Left Right Left Right   Dorsiflexion 4+/5 4+/5       Plantarflexion 4/5 4/5         PALPATION: sig TTP/moderate TrP present to R QL, R>L lumbar paraspinals, R PSIS, B glutes/piriformis. SPECIAL TEST: (-) slump, (-) SLR, (+) Marilee B, (-) FADDIR, thigh thrust.  NEURO SCREEN: patella tendon DTR R 1+/L 1+, achilles DTR R 1+/L 1+; sensation symmetrical and intact to light touch B LE dermatomes. Pt presents w/ sx suggesting/consistent with chronic LBP 2' scoliosis. Pt ed on dx, prognosis, POC, HEP, relevant anatomy, and body mechanics/posture. Pt could benefit from PT to address impairments and functional limitations in order to improve pt's ability to complete ADLs. Evaluation Complexity:  History:  MEDIUM  Complexity : 1-2 comorbidities / personal factors will impact the outcome/ POC ; Examination:  MEDIUM Complexity : 3 Standardized tests and measures addressin body structure, function, activity limitation and / or participation in recreation  ;Presentation:  MEDIUM Complexity : Evolving with changing characteristics  ; Clinical Decision Making:  MEDIUM Complexity : FOTO score of 26-74 FOTO score = an established functional score where 100 = no disability  Overall Complexity Rating: MEDIUM  Problem List: pain affecting function, decrease ROM, decrease strength, decrease ADL/functional abilities, decrease activity tolerance, decrease flexibility/joint mobility, and decrease transfer abilities    Treatment Plan may include any combination of the followin Therapeutic Exercise, 35532 Neuromuscular Re-Education, 66647 Manual Therapy, 25386 Therapeutic Activity, 74255 Self Care/Home Management, and 43075 Electrical Stim unattended  Patient / Family readiness to learn indicated by: asking questions, trying to perform skills, interest, return verbalization , and return demonstration   Persons(s) to be included in education: patient (P)  Barriers to Learning/Limitations: none  Measures taken if barriers to learning present: n/a  Patient Goal (s): \"ways to strengthen and help my daily life\"  Patient Self Reported Health Status: good  Rehabilitation Potential: good    Short Term Goals: To be accomplished in 2 weeks  Pt will be ind and compliant with HEP to promote self-management of sx  Long Term Goals: To be accomplished in 4-6 weeks  Pt will improve B glute max strength by at least 1/3 grade to improve ease of ADLs  Pt will demo good TA isometric/maintenance with dynamic  UE/LE movements to enable pt ability to perform prolonged standing and walking  Pt will note max daily pain </= 4/10 to improve QOL  Improve FOTO score to >/= 61/100 to indicate improved function    Frequency / Duration: Patient to be seen 2 times per week for 4-6 weeks    Patient/ Caregiver education and instruction: Diagnosis, prognosis, self care and exercises [x]  Plan of care has been reviewed with PTA    Certification Period: Velma Mcgowan, PT       3/10/2023       7:47 AM  ===================================================================  I certify that the above Therapy Services are being furnished while the patient is under my care. I agree with the treatment plan and certify that this therapy is necessary.     [de-identified] Signature:_________________________   DATE:_________   TIME:________                           Farnaz Stanley,     ** Signature, Date and Time must be completed for valid certification **  Please sign and fax to [facility] [phone].   Thank you

## 2023-03-16 ENCOUNTER — HOSPITAL ENCOUNTER (OUTPATIENT)
Facility: HOSPITAL | Age: 63
Setting detail: RECURRING SERIES
Discharge: HOME | End: 2023-03-19
Payer: COMMERCIAL

## 2023-03-16 PROCEDURE — 97140 MANUAL THERAPY 1/> REGIONS: CPT

## 2023-03-16 PROCEDURE — 97112 NEUROMUSCULAR REEDUCATION: CPT

## 2023-03-16 PROCEDURE — 97110 THERAPEUTIC EXERCISES: CPT

## 2023-03-16 NOTE — PROGRESS NOTES
daily pain </= 4/10 to improve QOL  Improve FOTO score to >/= 61/100 to indicate improved function    PLAN  yes Continue plan of care  []  Upgrade activities as tolerated  []  Discharge due to :  [x]  Other: progress as tolerated     Beckie Christine, PT    3/16/2023    2:51 PM    Future Appointments   Date Time Provider Jayne Stone   3/23/2023  2:00 PM Venancio Higgins, [place] [facility]   3/28/2023  2:00 PM Wess Royalty, PTA [facility]   3/30/2023  2:00 PM Wess Royalty, PTA [facility]   4/4/2023  2:30 PM [facility], PTA [facility]   4/6/2023  4:00 PM Kesha Gtz, PT [facility]   4/11/2023  4:00 PM Kesha Gtz, PT [facility]   4/13/2023  4:00 PM Beckie Christine, PT [facility]

## 2023-03-23 ENCOUNTER — HOSPITAL ENCOUNTER (OUTPATIENT)
Facility: HOSPITAL | Age: 63
Setting detail: RECURRING SERIES
Discharge: HOME | End: 2023-03-26
Payer: COMMERCIAL

## 2023-03-23 PROCEDURE — G0283 ELEC STIM OTHER THAN WOUND: HCPCS

## 2023-03-23 PROCEDURE — 97110 THERAPEUTIC EXERCISES: CPT

## 2023-03-23 PROCEDURE — 97112 NEUROMUSCULAR REEDUCATION: CPT

## 2023-03-23 NOTE — PROGRESS NOTES
will be ind and compliant with HEP to promote self-management of sx  Current:  reviewed program , reports good compliance 03/16/2023    Long Term Goals:  To be accomplished in 4-6 weeks  Pt will improve B glute max strength by at least 1/3 grade to improve ease of ADLs  Pt will demo good TA isometric/maintenance with dynamic  UE/LE movements to enable pt ability to perform prolonged standing and walking  Pt will note max daily pain </= 4/10 to improve QOL  Current: pain 5-6/10 upon arrival today 03/23/2023  Improve FOTO score to >/= 61/100 to indicate improved function    PLAN  yes Continue plan of care  []  Upgrade activities as tolerated  []  Discharge due to :  [x]  Other: progress as tolerated     J Carlos Peña, PT    3/23/2023    2:00 PM    Future Appointments   Date Time Provider Jayne Stone   3/28/2023  2:00 PM Thierno Luna, PTA [facility]   3/30/2023  2:00 PM Thierno Luna, PTA [facility]   4/4/2023  2:30 PM Thierno Luna, PTA [facility]   4/6/2023  4:00 PM Kesha Boyle, PT [facility]   4/11/2023  4:00 PM Thierno Luna, PTA [facility]   4/13/2023  4:00 PM Thierno Luna PTA [facility]

## 2023-03-28 ENCOUNTER — HOSPITAL ENCOUNTER (OUTPATIENT)
Facility: HOSPITAL | Age: 63
Setting detail: RECURRING SERIES
Discharge: HOME | End: 2023-03-31
Payer: COMMERCIAL

## 2023-03-28 PROCEDURE — 97140 MANUAL THERAPY 1/> REGIONS: CPT

## 2023-03-28 PROCEDURE — G0283 ELEC STIM OTHER THAN WOUND: HCPCS

## 2023-03-28 PROCEDURE — 97110 THERAPEUTIC EXERCISES: CPT

## 2023-03-28 NOTE — PROGRESS NOTES
PHYSICAL / OCCUPATIONAL THERAPY - DAILY TREATMENT NOTE (updated )    Patient Name: Radha Magaña    Date: 3/28/2023    : 1960  Insurance: Payor: Malika Longoria / Plan: Malika Longoria HMO / Product Type: *No Product type* /      Patient  verified yes     Visit #   Current / Total 4 8-12   Time   In / Out 3:32 4:30   Total Treatment Time 58   Pain   In / Out 2/10 1/10   Subjective Functional Status/Changes: I did better after the last time I was here but then I went home and when I laid down I had those painful spasms   Changes to:  Meds, Allergies, Med Hx, Sx Hx? If yes, update Summary List no       TREATMENT AREA =  Other low back pain [M54.59]    OBJECTIVE    Modalities Rationale:     decrease inflammation and decrease pain to improve patient's ability to progress to PLOF and address remaining functional goals. 15 min [x] Estim Unattended, type/location: In (R) side lying premod to the (L) thoracolumbar/rib area                                     [x]  w/ice    []  w/heat    min [] Estim Attended, type/location:                                     []  w/US     []  w/ice    []  w/heat    []  TENS insruct      min []  Mechanical Traction: type/lbs                   []  pro   []  sup   []  int   []  cont    []  before manual    []  after manual    min []  Ultrasound, settings/location:      min []  Iontophoresis w/ dexamethasone, location:                                               []  take home patch       []  in clinic    min  unbilled []  Ice     []  Heat    location/position:     min []  Paraffin,  details:     min []  Vasopneumatic Device, press/temp:     min []  Shu Kida / Erin Erichsen:     If using vaso (only need to measure limb vaso being performed on)      pre-treatment girth :       post-treatment girth :       measured at (landmark location) :      min []  Other:    Skin assessment post-treatment (if applicable):    []  intact    []  redness- no adverse reaction                 []redness - adverse

## 2023-03-30 ENCOUNTER — HOSPITAL ENCOUNTER (OUTPATIENT)
Facility: HOSPITAL | Age: 63
Setting detail: RECURRING SERIES
End: 2023-03-30
Payer: COMMERCIAL

## 2023-03-30 PROCEDURE — G0283 ELEC STIM OTHER THAN WOUND: HCPCS

## 2023-03-30 PROCEDURE — 97140 MANUAL THERAPY 1/> REGIONS: CPT

## 2023-03-30 PROCEDURE — 97530 THERAPEUTIC ACTIVITIES: CPT

## 2023-03-30 NOTE — PROGRESS NOTES
reaction                 []redness - adverse reaction:      Vasopnuematic compression justification:  Per bilateral girth measures taken and listed above the edema is considered significant and having an impact on the patient's self care and ADL's     Therapeutic Procedures: Tx Min Billable or 1:1 Min (if diff from Tx Min) Procedure, Rationale, Specifics   held  99471 Therapeutic Exercise (timed):  increase ROM, strength, coordination, balance, and proprioception to improve patient's ability to progress to PLOF and address remaining functional goals. (see flow sheet as applicable)     Details if applicable: Ball roll out left for gentle lat stretch  Seated march with TA   APT seated   Bike    Held:  Shoulder ABD full AROM single arm   Shoulder flexion full AROM single arm   Dynamic hug without resistance    HEP review    14  54196 Therapeutic Activity (timed):  use of dynamic activities replicating functional movements to increase ROM, strength, coordination, balance, and proprioception in order to improve patient's ability to progress to PLOF and address remaining functional goals. (see flow sheet as applicable)     Details if applicable:   added hip flexor stretch in standing  Added prone on elbows over 3 pillows    1/2 prone press up with overpressure to the sacrum  Demonstrate modified prone on elbow in standing with use of wall    held  27834 Neuromuscular Re-Education (timed):  improve balance, coordination, kinesthetic sense, posture, core stability and proprioception to improve patient's ability to develop conscious control of individual muscles and awareness of position of extremities in order to progress to PLOF and address remaining functional goals.  (see flow sheet as applicable)     Details if applicable:    PPT seated       Seated UE PNF D1/D2   HEP REVIEW       Held:  Pallof press   TB chops   SA wall slide with pillow case  March on B    25   90572 Manual Therapy (timed):  decrease pain, flexor stretch in standing  Added prone on elbows over 3 pillows    1/2 prone press up with overpressure to the sacrum  Demonstrate modified prone on elbow in standing with use of wall   GOALS    Pt will note max daily pain </= 4/10 to improve QOL  Current: pain 2/10 upon arrival today 03/30/2023    Patient will continue to benefit from skilled PT / OT services to modify and progress therapeutic interventions, analyze and address functional mobility deficits, analyze and address ROM deficits, analyze and address strength deficits, analyze and address soft tissue restrictions, analyze and cue for proper movement patterns, analyze and modify for postural abnormalities, and analyze and address imbalance/dizziness to address functional deficits and attain remaining goals. Progress toward goals / Updated goals:  []  See Progress Note/Recertification    Short Term Goals: To be accomplished in 2 weeks  Pt will be ind and compliant with HEP to promote self-management of sx  Current:  reviewed program , reports good compliance 03/16/2023    Long Term Goals:  To be accomplished in 4-6 weeks  Pt will improve B glute max strength by at least 1/3 grade to improve ease of ADLs  Pt will demo good TA isometric/maintenance with dynamic  UE/LE movements to enable pt ability to perform prolonged standing and walking  Pt will note max daily pain </= 4/10 to improve QOL  Current: pain 2/10 upon arrival today 03/30/2023  Improve FOTO score to >/= 61/100 to indicate improved function    PLAN  yes Continue plan of care  []  Upgrade activities as tolerated  []  Discharge due to :  [x]  Other: progress as tolerated     Rylan Fonseca PTA    3/30/2023    2:53 PM    Future Appointments   Date Time Provider Jayne Stone   4/4/2023  2:30 PM Db Espana PTA [facility]   4/6/2023  4:00 PM Db Espana PTA [facility]   4/11/2023  4:00 PM Db Espana PTA [facility]   4/13/2023  4:00 PM Db Espana PTA [facility] Patient

## 2023-04-04 ENCOUNTER — HOSPITAL ENCOUNTER (OUTPATIENT)
Facility: HOSPITAL | Age: 63
Setting detail: RECURRING SERIES
Discharge: HOME | End: 2023-04-07
Payer: COMMERCIAL

## 2023-04-04 PROCEDURE — 97530 THERAPEUTIC ACTIVITIES: CPT

## 2023-04-04 PROCEDURE — G0283 ELEC STIM OTHER THAN WOUND: HCPCS

## 2023-04-04 PROCEDURE — 97140 MANUAL THERAPY 1/> REGIONS: CPT

## 2023-04-04 NOTE — PROGRESS NOTES
[]redness - adverse reaction:      Vasopnuematic compression justification:  Per bilateral girth measures taken and listed above the edema is considered significant and having an impact on the patient's self care and ADL's     Therapeutic Procedures: Tx Min Billable or 1:1 Min (if diff from Tx Min) Procedure, Rationale, Specifics   held  [id number] Therapeutic Exercise (timed):  increase ROM, strength, coordination, balance, and proprioception to improve patient's ability to progress to PLOF and address remaining functional goals. (see flow sheet as applicable)     Details if applicable: Ball roll out left for gentle lat stretch  Seated march with TA   APT seated   Bike    Held:  Shoulder ABD full AROM single arm   Shoulder flexion full AROM single arm   Dynamic hug without resistance    HEP review    27  [id number] Therapeutic Activity (timed):  use of dynamic activities replicating functional movements to increase ROM, strength, coordination, balance, and proprioception in order to improve patient's ability to progress to PLOF and address remaining functional goals. (see flow sheet as applicable)     Details if applicable:   hip flexor stretch in standing   prone on elbows over 3 pillows    1/2 prone press up with manual lateral shift to the (R)  Demonstrate modified prone on elbow in standing with use of wall    held  13896 Neuromuscular Re-Education (timed):  improve balance, coordination, kinesthetic sense, posture, core stability and proprioception to improve patient's ability to develop conscious control of individual muscles and awareness of position of extremities in order to progress to PLOF and address remaining functional goals.  (see flow sheet as applicable)     Details if applicable:    PPT seated       Seated UE PNF D1/D2   HEP REVIEW       Held:  Pallof press   TB chops   SA wall slide with pillow case  March on GSB    20   [id number] Manual Therapy (timed):  decrease pain, increase ROM,

## 2023-04-13 ENCOUNTER — HOSPITAL ENCOUNTER (OUTPATIENT)
Facility: HOSPITAL | Age: 63
Setting detail: RECURRING SERIES
Discharge: HOME | End: 2023-04-16
Payer: COMMERCIAL

## 2023-04-13 PROCEDURE — 97530 THERAPEUTIC ACTIVITIES: CPT

## 2023-04-13 PROCEDURE — 97140 MANUAL THERAPY 1/> REGIONS: CPT

## 2023-04-13 PROCEDURE — G0283 ELEC STIM OTHER THAN WOUND: HCPCS

## 2023-04-18 ENCOUNTER — HOSPITAL ENCOUNTER (OUTPATIENT)
Facility: HOSPITAL | Age: 63
Setting detail: RECURRING SERIES
Discharge: HOME | End: 2023-04-21
Payer: COMMERCIAL

## 2023-04-18 PROCEDURE — G0283 ELEC STIM OTHER THAN WOUND: HCPCS

## 2023-04-18 PROCEDURE — 97530 THERAPEUTIC ACTIVITIES: CPT

## 2023-04-18 PROCEDURE — 97140 MANUAL THERAPY 1/> REGIONS: CPT

## 2023-04-18 NOTE — PROGRESS NOTES
PHYSICAL / OCCUPATIONAL THERAPY - DAILY TREATMENT NOTE (updated )    Patient Name: Jermaine Winkler [place]    Date: 2023    : 1960  Insurance: Payor: Eddie Stewart / Plan: Eddie Stewart HMO / Product Type: *No Product type* /      Patient  verified yes     Visit #   Current / Total 3 8-12   Time   In / Out 4:22 5:20   Total Treatment Time 58   Pain   In / Out 3/10 1/10   Subjective Functional Status/Changes: I am hurting more today because I had to go up 15 steps several times today at work and I am hurting more today because of it    Changes to:  Meds, Allergies, Med Hx, Sx Hx? If yes, update Summary List no       TREATMENT AREA =  Other low back pain [M54.59]    OBJECTIVE    Modalities Rationale:     decrease inflammation and decrease pain to improve patient's ability to progress to PLOF and address remaining functional goals. 10 min [x] Estim Unattended, type/location: In prone over 3 pillows premod to lumbar sacral area with CP                                     [x]  w/ice    []  w/heat    min [] Estim Attended, type/location:                                     []  w/US     []  w/ice    []  w/heat    []  TENS insruct      min []  Mechanical Traction: type/lbs                   []  pro   []  sup   []  int   []  cont    []  before manual    []  after manual   held min [x]  Ultrasound, settings/location:  (L) PSIS and left border of sacrum     min []  Iontophoresis w/ dexamethasone, location:                                               []  take home patch       []  in clinic    min  unbilled []  Ice     []  Heat    location/position:     min []  Paraffin,  details:     min []  Vasopneumatic Device, press/temp:     min []  Giorgi Cool / Lennie Ode:     If using vaso (only need to measure limb vaso being performed on)      pre-treatment girth :       post-treatment girth :       measured at (landmark location) :      min []  Other:    Skin assessment post-treatment (if applicable):    []  intact    []  redness- no

## 2023-04-20 ENCOUNTER — APPOINTMENT (OUTPATIENT)
Facility: HOSPITAL | Age: 63
End: 2023-04-20
Payer: COMMERCIAL

## 2023-04-25 ENCOUNTER — HOSPITAL ENCOUNTER (OUTPATIENT)
Facility: HOSPITAL | Age: 63
Setting detail: RECURRING SERIES
Discharge: HOME | End: 2023-04-28
Payer: COMMERCIAL

## 2023-04-25 PROCEDURE — 97140 MANUAL THERAPY 1/> REGIONS: CPT

## 2023-04-25 PROCEDURE — 97530 THERAPEUTIC ACTIVITIES: CPT

## 2023-04-25 NOTE — PROGRESS NOTES
PHYSICAL / OCCUPATIONAL THERAPY - DAILY TREATMENT NOTE (updated )    Patient Name: Rony Magaña    Date: 2023    : 1960  Insurance: Payor: Boy Ontiveros / Plan: Boy Ontiveros HMO / Product Type: *No Product type* /      Patient  verified yes     Visit #   Current / Total 4 8-12   Time   In / Out 4:22 5:10   Total Treatment Time 48   Pain   In / Out 3/10 1/10   Subjective Functional Status/Changes: I am doing good, it mainly like a tight band over that one spot in my side   Changes to:  Meds, Allergies, Med Hx, Sx Hx? If yes, update Summary List no       TREATMENT AREA =  Other low back pain [M54.59]    OBJECTIVE    Modalities Rationale:     decrease inflammation and decrease pain to improve patient's ability to progress to PLOF and address remaining functional goals. TC min [x] Estim Unattended, type/location: In prone over 3 pillows premod to lumbar sacral area with CP                                     [x]  w/ice    []  w/heat    min [] Estim Attended, type/location:                                     []  w/US     []  w/ice    []  w/heat    []  TENS insruct      min []  Mechanical Traction: type/lbs                   []  pro   []  sup   []  int   []  cont    []  before manual    []  after manual   held min [x]  Ultrasound, settings/location:  (L) PSIS and left border of sacrum     min []  Iontophoresis w/ dexamethasone, location:                                               []  take home patch       []  in clinic    min  unbilled []  Ice     []  Heat    location/position:     min []  Paraffin,  details:     min []  Vasopneumatic Device, press/temp:     min []  Yumi Farmer / Juanjose Collado:     If using vaso (only need to measure limb vaso being performed on)      pre-treatment girth :       post-treatment girth :       measured at (landmark location) :      min []  Other:    Skin assessment post-treatment (if applicable):    []  intact    []  redness- no adverse reaction                 []redness - adverse

## 2023-04-27 ENCOUNTER — APPOINTMENT (OUTPATIENT)
Facility: HOSPITAL | Age: 63
End: 2023-04-27
Payer: COMMERCIAL

## 2023-05-05 ENCOUNTER — HOSPITAL ENCOUNTER (OUTPATIENT)
Facility: HOSPITAL | Age: 63
Setting detail: RECURRING SERIES
Discharge: HOME | End: 2023-05-08
Payer: COMMERCIAL

## 2023-05-05 PROCEDURE — 97140 MANUAL THERAPY 1/> REGIONS: CPT

## 2023-05-05 PROCEDURE — 97530 THERAPEUTIC ACTIVITIES: CPT

## 2023-05-05 PROCEDURE — 97112 NEUROMUSCULAR REEDUCATION: CPT

## 2023-05-09 ENCOUNTER — HOSPITAL ENCOUNTER (OUTPATIENT)
Facility: HOSPITAL | Age: 63
Setting detail: RECURRING SERIES
Discharge: HOME | End: 2023-05-12
Payer: COMMERCIAL

## 2023-05-09 PROCEDURE — 97140 MANUAL THERAPY 1/> REGIONS: CPT

## 2023-05-09 PROCEDURE — G0283 ELEC STIM OTHER THAN WOUND: HCPCS

## 2023-05-09 PROCEDURE — 97530 THERAPEUTIC ACTIVITIES: CPT

## 2023-05-09 PROCEDURE — 97112 NEUROMUSCULAR REEDUCATION: CPT

## 2023-05-09 NOTE — PROGRESS NOTES
PHYSICAL / OCCUPATIONAL THERAPY - DAILY TREATMENT NOTE (updated )    Patient Name: Randa Magaña    Date: 2023    : 1960  Insurance: Payor: Rosanna Lind / Plan: Rosanna Lind HMO / Product Type: *No Product type* /      Patient  verified yes     Visit #   Current / Total 1 8-12   Time   In / Out 3:46 4:45   Total Treatment Time 59   Pain   In / Out 2/10 0/10   Subjective Functional Status/Changes: Yesterday at work I had one of those sharp grabbing pain to the (L) side of my back- only that one spot is still bothering me    Changes to:  Meds, Allergies, Med Hx, Sx Hx? If yes, update Summary List no       TREATMENT AREA =  Other low back pain [M54.59]    OBJECTIVE    Modalities Rationale:     decrease inflammation and decrease pain to improve patient's ability to progress to PLOF and address remaining functional goals. 15 min [x] Estim Unattended, type/location: In (R) side lying to (L) QL and lumbar area                                     [x]  w/ice    []  w/heat    min [] Estim Attended, type/location:                                     []  w/US     []  w/ice    []  w/heat    []  TENS insruct      min []  Mechanical Traction: type/lbs                   []  pro   []  sup   []  int   []  cont    []  before manual    []  after manual   held min [x]  Ultrasound, settings/location:  (L) PSIS and left border of sacrum     min []  Iontophoresis w/ dexamethasone, location:                                               []  take home patch       []  in clinic    min  unbilled []  Ice     []  Heat    location/position:     min []  Paraffin,  details:     min []  Vasopneumatic Device, press/temp:     min []  Viet Shouts / Sissy Grimesxley:     If using vaso (only need to measure limb vaso being performed on)      pre-treatment girth :       post-treatment girth :       measured at (landmark location) :      min []  Other:    Skin assessment post-treatment (if applicable):    []  intact    []  redness- no adverse reaction

## 2023-05-18 ENCOUNTER — APPOINTMENT (OUTPATIENT)
Facility: HOSPITAL | Age: 63
End: 2023-05-18
Payer: COMMERCIAL

## 2023-05-25 ENCOUNTER — APPOINTMENT (OUTPATIENT)
Facility: HOSPITAL | Age: 63
End: 2023-05-25
Payer: COMMERCIAL

## 2025-01-14 NOTE — PROGRESS NOTES
Discussion about strategies for wtg loss;   PT DAILY TREATMENT NOTE     Patient Name: Christopher Ho  Date:10/9/2017  : 1960  [x]  Patient  Verified  Payor: [employer] / Plan: Chip Morales [id number] PPO / Product Type: PPO /    In time: 9:05  Out time: 10:00  Total Treatment Time (min):55  Visit #: 6 of     Treatment Area: S/P rotator cuff repair [Z98.890]  Right shoulder pain [M25.511]    SUBJECTIVE  Pain Level IN: (0-10 scale): 310   Pain Level OUT: (0-10 scale) post treatment: 310    Any medication changes, allergies to medications, adverse drug reactions, diagnosis change, or new procedure performed?: [x] No    [] Yes (see summary sheet for update)  Subjective functional status/changes:   [x] No changes reported  I went to the beach yesterday - or I tried to go to the beach but once I got there I was already having so much pain and went home - I did realize that I must have undid my sling in the middle of the night and I must of flinched and it woke me up and I was hurting     OBJECTIVE    Modality rationale: decrease edema, decrease inflammation and decrease pain to improve the patients ability to increase shoulder ROM   Min Type Additional Details    [] Estim:  []Unatt       []IFC  []Premod                        []Other:  []w/ice   []w/heat  Position:  Location:    [] Estim: []Att    []TENS instruct  []NMES                    []Other:  []w/US   []w/ice   []w/heat  Position:  Location:    []  Traction: [] Cervical       []Lumbar                       [] Prone          []Supine                       []Intermittent   []Continuous Lbs:  [] before manual  [] after manual    []  Ultrasound: []Continuous   [] Pulsed                           []1MHz   []3MHz W/cm2:  Location:    []  Iontophoresis with dexamethasone         Location: [] Take home patch   [] In clinic   10  []  Ice     [x]  heat  []  Ice massage  []  Laser   []  Anodyne Position:in reclined  Location:(R) shoulder     []  Laser with stim  []  Other: Position:  Location:   15+ 5min set up [x]  Vasopneumatic Device Pressure:       [x] lo [] med [] hi   Temperature: [x] lo [] med [] hi   [] Skin assessment post-treatment:  []intact []redness- no adverse reaction    []redness  adverse reaction:        min Therapeutic Exercise:  [x] See flow sheet : HEP   Rationale: increase ROM and increase strength to improve the patients ability to use the (R) shoulder per protocol      25 min Manual Therapy: In semi-reclined position - PROM, STM to scapula border   Rationale: decrease pain, increase ROM, increase tissue extensibility and decrease edema  to (R) shoulder             With   [] TE   [] TA   [] neuro   [] other: Patient Education: [x] Review HEP    [] Progressed/Changed HEP based on:   [] positioning   [] body mechanics   [] transfers   [x] heat/ice application    [] other:      Other Objective/Functional Measures:   PROM measured in reclined 140 deg with flexion and scaption -STG#1 MET  30 degrees of ER  approx 120 with abduction       ASSESSMENT/Changes in Function: will send update to MD with above measurements secondary to follow up tomorrow. Patient will continue to benefit from skilled PT services to modify and progress therapeutic interventions, address functional mobility deficits, address ROM deficits, address strength deficits, analyze and address soft tissue restrictions and instruct in home and community integration to attain remaining goals. []  See Plan of Care  [x]  See progress note/recertification  []  See Discharge Summary         Progress towards goals / Updated goals: · Short Term Goals: To be accomplished in  4  weeks:  1. Patient will demonstrate at least 140 degrees passive flexion. MET 10/9/17  2. Independent/compliant with appropriate HEP for RUE strength/ROM as per protocol. 3.  Patient will report 50% improvement in sleep quality stemming from shoulder symptoms     · Long Term Goals: To be accomplished in  6-8  weeks:  1.   Pt will be able to progress to active assisted/AROM activities up to 90 degrees pending MD clearance  2. Improve FOTO outcome score by 20-25% to indicate a significant improvement in ADL function  3.   Patient will report > 50% functional improvement with washing, dressing ADL's       PLAN  [x]  Upgrade activities as tolerated     [x]  Continue plan of care  []  Update interventions per flow sheet       []  Discharge due to:_  [x]  Other:_  Please refer to progress report     Padma Knapp PTA,   10/9/2017      Future Appointments  Date Time Provider Nancy Cantu   10/11/2017 2:30 PM Mamie Garcia PT [facility]   10/13/2017 8:00 AM Mamie Garcia PT [facility]   10/16/2017 2:00 PM [facility] [place] [facility]   10/18/2017 8:30 AM Padma Knapp PTA [facility]   10/20/2017 10:30 AM Mamie Garcia PT [facility]